# Patient Record
Sex: MALE | Race: BLACK OR AFRICAN AMERICAN | Employment: OTHER | URBAN - METROPOLITAN AREA
[De-identification: names, ages, dates, MRNs, and addresses within clinical notes are randomized per-mention and may not be internally consistent; named-entity substitution may affect disease eponyms.]

---

## 2017-01-10 ENCOUNTER — HOSPITAL ENCOUNTER (OUTPATIENT)
Dept: LAB | Age: 77
Discharge: HOME | End: 2017-01-10

## 2017-01-10 PROCEDURE — 88305 TISSUE EXAM BY PATHOLOGIST: CPT | Performed by: INTERNAL MEDICINE

## 2017-04-10 ENCOUNTER — APPOINTMENT (OUTPATIENT)
Dept: GENERAL RADIOLOGY | Age: 77
DRG: 310 | End: 2017-04-10
Attending: EMERGENCY MEDICINE
Payer: MEDICARE

## 2017-04-10 ENCOUNTER — HOSPITAL ENCOUNTER (INPATIENT)
Age: 77
LOS: 4 days | Discharge: HOME | DRG: 310 | End: 2017-04-14
Attending: EMERGENCY MEDICINE | Admitting: INTERNAL MEDICINE
Payer: MEDICARE

## 2017-04-10 DIAGNOSIS — J06.9 ACUTE UPPER RESPIRATORY INFECTION: ICD-10-CM

## 2017-04-10 DIAGNOSIS — I48.91 ATRIAL FIBRILLATION WITH RVR (HCC): Primary | ICD-10-CM

## 2017-04-10 LAB
ALBUMIN SERPL BCP-MCNC: 3.2 G/DL (ref 3.2–4.6)
ALBUMIN/GLOB SERPL: 0.9 {RATIO} (ref 1.2–3.5)
ALP SERPL-CCNC: 84 U/L (ref 50–136)
ALT SERPL-CCNC: 45 U/L (ref 12–65)
ANION GAP BLD CALC-SCNC: 9 MMOL/L (ref 7–16)
AST SERPL W P-5'-P-CCNC: 21 U/L (ref 15–37)
ATRIAL RATE: 113 BPM
ATRIAL RATE: 81 BPM
BASOPHILS # BLD AUTO: 0 K/UL (ref 0–0.2)
BASOPHILS # BLD: 0 % (ref 0–2)
BILIRUB SERPL-MCNC: 0.4 MG/DL (ref 0.2–1.1)
BNP SERPL-MCNC: 434 PG/ML
BUN SERPL-MCNC: 16 MG/DL (ref 8–23)
CALCIUM SERPL-MCNC: 9.6 MG/DL (ref 8.3–10.4)
CALCULATED R AXIS, ECG10: 19 DEGREES
CALCULATED R AXIS, ECG10: 20 DEGREES
CALCULATED T AXIS, ECG11: 17 DEGREES
CALCULATED T AXIS, ECG11: 26 DEGREES
CHLORIDE SERPL-SCNC: 108 MMOL/L (ref 98–107)
CO2 SERPL-SCNC: 25 MMOL/L (ref 21–32)
CREAT SERPL-MCNC: 1.04 MG/DL (ref 0.8–1.5)
DIAGNOSIS, 93000: NORMAL
DIAGNOSIS, 93000: NORMAL
DIFFERENTIAL METHOD BLD: ABNORMAL
EOSINOPHIL # BLD: 0 K/UL (ref 0–0.8)
EOSINOPHIL NFR BLD: 0 % (ref 0.5–7.8)
ERYTHROCYTE [DISTWIDTH] IN BLOOD BY AUTOMATED COUNT: 14 % (ref 11.9–14.6)
FLUAV AG NPH QL IA: NEGATIVE
FLUBV AG NPH QL IA: NEGATIVE
GLOBULIN SER CALC-MCNC: 3.4 G/DL (ref 2.3–3.5)
GLUCOSE SERPL-MCNC: 98 MG/DL (ref 65–100)
HCT VFR BLD AUTO: 34.7 % (ref 41.1–50.3)
HGB BLD-MCNC: 11.4 G/DL (ref 13.6–17.2)
IMM GRANULOCYTES # BLD: 0 K/UL (ref 0–0.5)
IMM GRANULOCYTES NFR BLD AUTO: 0.1 % (ref 0–5)
LYMPHOCYTES # BLD AUTO: 12 % (ref 13–44)
LYMPHOCYTES # BLD: 0.9 K/UL (ref 0.5–4.6)
MAGNESIUM SERPL-MCNC: 2.2 MG/DL (ref 1.8–2.4)
MAGNESIUM SERPL-MCNC: 2.4 MG/DL (ref 1.8–2.4)
MCH RBC QN AUTO: 27 PG (ref 26.1–32.9)
MCHC RBC AUTO-ENTMCNC: 32.9 G/DL (ref 31.4–35)
MCV RBC AUTO: 82.2 FL (ref 79.6–97.8)
MONOCYTES # BLD: 0.6 K/UL (ref 0.1–1.3)
MONOCYTES NFR BLD AUTO: 8 % (ref 4–12)
NEUTS SEG # BLD: 5.8 K/UL (ref 1.7–8.2)
NEUTS SEG NFR BLD AUTO: 80 % (ref 43–78)
PLATELET # BLD AUTO: 168 K/UL (ref 150–450)
PMV BLD AUTO: 12.8 FL (ref 10.8–14.1)
POTASSIUM SERPL-SCNC: 3.8 MMOL/L (ref 3.5–5.1)
PROT SERPL-MCNC: 6.6 G/DL (ref 6.3–8.2)
Q-T INTERVAL, ECG07: 338 MS
Q-T INTERVAL, ECG07: 340 MS
QRS DURATION, ECG06: 100 MS
QRS DURATION, ECG06: 96 MS
QTC CALCULATION (BEZET), ECG08: 420 MS
QTC CALCULATION (BEZET), ECG08: 445 MS
RBC # BLD AUTO: 4.22 M/UL (ref 4.23–5.67)
SODIUM SERPL-SCNC: 142 MMOL/L (ref 136–145)
TROPONIN I BLD-MCNC: 0.3 NG/ML (ref 0–0.08)
TROPONIN I BLD-MCNC: 0.34 NG/ML (ref 0–0.08)
TROPONIN I SERPL-MCNC: 0.43 NG/ML (ref 0.02–0.05)
TSH SERPL DL<=0.005 MIU/L-ACNC: <0.005 UIU/ML (ref 0.36–3.74)
VENTRICULAR RATE, ECG03: 103 BPM
VENTRICULAR RATE, ECG03: 93 BPM
WBC # BLD AUTO: 7.3 K/UL (ref 4.3–11.1)

## 2017-04-10 PROCEDURE — 84484 ASSAY OF TROPONIN QUANT: CPT

## 2017-04-10 PROCEDURE — 96376 TX/PRO/DX INJ SAME DRUG ADON: CPT | Performed by: EMERGENCY MEDICINE

## 2017-04-10 PROCEDURE — 74011000258 HC RX REV CODE- 258: Performed by: NURSE PRACTITIONER

## 2017-04-10 PROCEDURE — 74011250637 HC RX REV CODE- 250/637: Performed by: NURSE PRACTITIONER

## 2017-04-10 PROCEDURE — 96374 THER/PROPH/DIAG INJ IV PUSH: CPT | Performed by: EMERGENCY MEDICINE

## 2017-04-10 PROCEDURE — 83735 ASSAY OF MAGNESIUM: CPT | Performed by: NURSE PRACTITIONER

## 2017-04-10 PROCEDURE — 99285 EMERGENCY DEPT VISIT HI MDM: CPT | Performed by: EMERGENCY MEDICINE

## 2017-04-10 PROCEDURE — 83880 ASSAY OF NATRIURETIC PEPTIDE: CPT | Performed by: INTERNAL MEDICINE

## 2017-04-10 PROCEDURE — 65660000000 HC RM CCU STEPDOWN

## 2017-04-10 PROCEDURE — 83735 ASSAY OF MAGNESIUM: CPT | Performed by: EMERGENCY MEDICINE

## 2017-04-10 PROCEDURE — 93005 ELECTROCARDIOGRAM TRACING: CPT | Performed by: INTERNAL MEDICINE

## 2017-04-10 PROCEDURE — 93005 ELECTROCARDIOGRAM TRACING: CPT | Performed by: NURSE PRACTITIONER

## 2017-04-10 PROCEDURE — 93005 ELECTROCARDIOGRAM TRACING: CPT | Performed by: EMERGENCY MEDICINE

## 2017-04-10 PROCEDURE — 87804 INFLUENZA ASSAY W/OPTIC: CPT | Performed by: EMERGENCY MEDICINE

## 2017-04-10 PROCEDURE — 71010 XR CHEST PORT: CPT

## 2017-04-10 PROCEDURE — 85025 COMPLETE CBC W/AUTO DIFF WBC: CPT | Performed by: EMERGENCY MEDICINE

## 2017-04-10 PROCEDURE — 80053 COMPREHEN METABOLIC PANEL: CPT | Performed by: EMERGENCY MEDICINE

## 2017-04-10 PROCEDURE — 84443 ASSAY THYROID STIM HORMONE: CPT | Performed by: NURSE PRACTITIONER

## 2017-04-10 PROCEDURE — 74011000250 HC RX REV CODE- 250: Performed by: NURSE PRACTITIONER

## 2017-04-10 PROCEDURE — 74011000250 HC RX REV CODE- 250: Performed by: EMERGENCY MEDICINE

## 2017-04-10 PROCEDURE — 36415 COLL VENOUS BLD VENIPUNCTURE: CPT | Performed by: NURSE PRACTITIONER

## 2017-04-10 RX ORDER — SODIUM CHLORIDE 0.9 % (FLUSH) 0.9 %
5-10 SYRINGE (ML) INJECTION EVERY 8 HOURS
Status: DISCONTINUED | OUTPATIENT
Start: 2017-04-10 | End: 2017-04-14 | Stop reason: HOSPADM

## 2017-04-10 RX ORDER — DILTIAZEM HYDROCHLORIDE 5 MG/ML
10 INJECTION INTRAVENOUS ONCE
Status: COMPLETED | OUTPATIENT
Start: 2017-04-10 | End: 2017-04-10

## 2017-04-10 RX ORDER — SODIUM CHLORIDE 0.9 % (FLUSH) 0.9 %
5-10 SYRINGE (ML) INJECTION AS NEEDED
Status: DISCONTINUED | OUTPATIENT
Start: 2017-04-10 | End: 2017-04-10

## 2017-04-10 RX ORDER — ACETAMINOPHEN 325 MG/1
650 TABLET ORAL
Status: DISCONTINUED | OUTPATIENT
Start: 2017-04-10 | End: 2017-04-14 | Stop reason: HOSPADM

## 2017-04-10 RX ORDER — SODIUM CHLORIDE 0.9 % (FLUSH) 0.9 %
5-10 SYRINGE (ML) INJECTION EVERY 8 HOURS
Status: DISCONTINUED | OUTPATIENT
Start: 2017-04-10 | End: 2017-04-10

## 2017-04-10 RX ORDER — FUROSEMIDE 40 MG/1
40 TABLET ORAL ONCE
Status: COMPLETED | OUTPATIENT
Start: 2017-04-10 | End: 2017-04-10

## 2017-04-10 RX ORDER — SODIUM CHLORIDE 0.9 % (FLUSH) 0.9 %
5-10 SYRINGE (ML) INJECTION AS NEEDED
Status: DISCONTINUED | OUTPATIENT
Start: 2017-04-10 | End: 2017-04-14 | Stop reason: HOSPADM

## 2017-04-10 RX ORDER — SOTALOL HYDROCHLORIDE 80 MG/1
160 TABLET ORAL EVERY 12 HOURS
Status: DISCONTINUED | OUTPATIENT
Start: 2017-04-10 | End: 2017-04-14 | Stop reason: HOSPADM

## 2017-04-10 RX ORDER — NITROGLYCERIN 0.4 MG/1
0.4 TABLET SUBLINGUAL
Status: DISCONTINUED | OUTPATIENT
Start: 2017-04-10 | End: 2017-04-14 | Stop reason: HOSPADM

## 2017-04-10 RX ORDER — FUROSEMIDE 40 MG/1
40 TABLET ORAL DAILY
Status: DISCONTINUED | OUTPATIENT
Start: 2017-04-10 | End: 2017-04-10

## 2017-04-10 RX ADMIN — FUROSEMIDE 40 MG: 40 TABLET ORAL at 19:45

## 2017-04-10 RX ADMIN — SOTALOL HYDROCHLORIDE 160 MG: 80 TABLET ORAL at 23:06

## 2017-04-10 RX ADMIN — DILTIAZEM HYDROCHLORIDE 10 MG: 5 INJECTION INTRAVENOUS at 19:22

## 2017-04-10 RX ADMIN — Medication 5 ML: at 17:52

## 2017-04-10 RX ADMIN — Medication 10 ML: at 23:08

## 2017-04-10 RX ADMIN — DILTIAZEM HYDROCHLORIDE 10 MG: 5 INJECTION INTRAVENOUS at 17:51

## 2017-04-10 RX ADMIN — RIVAROXABAN 20 MG: 20 TABLET, FILM COATED ORAL at 23:06

## 2017-04-10 RX ADMIN — SODIUM CHLORIDE 10 MG/HR: 900 INJECTION, SOLUTION INTRAVENOUS at 19:45

## 2017-04-10 NOTE — ED TRIAGE NOTES
Sent from PCP office with afib with RVR rate of 140s-170s. Given 20 mg cardizem IV with EMS. Now 100-130 still afib. Hx of afib. Went to [employer] office with c/o shob, weakness, and productive cough with brown sputum.

## 2017-04-10 NOTE — IP AVS SNAPSHOT
[address] 
[phone] Patient: Betty Sapp MRN: [id number] [facility]:[birth year] You are allergic to the following Allergen Reactions Codeine Anaphylaxis Doxazosin Mesylate Rash \"broke out in a sweat\" Lotrel (Amlodipine-Benazepril) Cough Pcn (Penicillins) Anaphylaxis Recent Documentation Height Weight BMI Smoking Status 1.803 m 91.1 kg 28.01 kg/m2 Never Smoker Emergency Contacts Name Discharge Info Relation Home Work Mobile Mila Talley  Spouse [3] [phone] Viktoria Seth  Child [2] [phone] About your hospitalization You were admitted on:  April 10, 2017 You last received care in the:  [facility] 3 TELEMETRY You were discharged on:  April 14, 2017 Unit phone number:  [phone] Why you were hospitalized Your primary diagnosis was:  Atrial Fibrillation With Rvr (Hcc) Your diagnoses also included:  Htn (Hypertension), Mixed Hyperlipidemia, Gerd (Gastroesophageal Reflux Disease), Hyperthyroidism Providers Seen During Your Hospitalizations Provider Role Specialty Primary office phone Janusz Ching MD Attending Provider Emergency Medicine [phone] Lee Brown MD Attending Provider Cardiology [phone] Your Primary Care Physician (PCP) Primary Care Physician Office Phone Office Fax [address] [facility] [phone] Follow-up Information Follow up With Details Comments Contact Info Mini Elizondo MD  Monday 4/19 at 10:30 am [address] [place] [address] [address] [facility] [address] 
[phone] Ulises Winter MD On 5/2/2017 at 1 am in [place] office [address] Health system [id number] [phone] Your Appointments  Wednesday April 19, 2017 10:30 AM EDT  
 Office Visit with Leroy Patiño NP [facility] ([address]) [facility]  
[address] [place] [address] [phone] Tuesday May 02, 2017  8:30 AM EDT HOSPITAL FOLLOW-UP with Alber Sandoval, [address] [facility] ([facility]) [address] 
[address] [place] [address]  
[phone] Current Discharge Medication List  
  
START taking these medications Dose & Instructions Dispensing Information Comments Morning Noon Evening Bedtime  
 sotalol 160 mg tablet Commonly known as:  [place] Your next dose is:  4/14/2017 PM  
   
 Dose:  160 mg Take 1 Tab by mouth every twelve (12) hours. Quantity:  60 Tab Refills:  11 CONTINUE these medications which have NOT CHANGED Dose & Instructions Dispensing Information Comments Morning Noon Evening Bedtime L. gasseri-B. bifidum-B longum 1.5 billion cell Cap Take  by mouth as needed. Refills:  0  
     
   
   
   
  
 methIMAzole 10 mg tablet Commonly known as:  TAPAZOLE Dose:  5 mg Take 5 mg by mouth daily. Refills:  0  
     
   
   
   
  
 rivaroxaban 20 mg Tab tablet Commonly known as:  Becky [place] Dose:  20 mg Take 1 Tab by mouth daily (with dinner). Indications: PREVENT THROMBOEMBOLISM IN CHRONIC ATRIAL FIBRILLATION Quantity:  90 Tab Refills:  3 STOP taking these medications   
 dilTIAZem  mg ER capsule Commonly known as:  CARDIZEM CD  
   
  
 metoprolol tartrate 50 mg tablet Commonly known as:  LOPRESSOR Where to Get Your Medications Information on where to get these meds will be given to you by the nurse or doctor. ! Ask your nurse or doctor about these medications  
  sotalol 160 mg tablet Discharge Instructions Atrial Fibrillation: Care Instructions Your Care Instructions Atrial fibrillation is an irregular and often fast heartbeat. Treating this condition is important for several reasons. It can cause blood clots, which can travel from your heart to your brain and cause a stroke. If you have a fast heartbeat, you may feel lightheaded, dizzy, and weak. An irregular heartbeat can also increase your risk for heart failure. Atrial fibrillation is often the result of another heart condition, such as high blood pressure or coronary artery disease. Making changes to improve your heart condition will help you stay healthy and active. Follow-up care is a key part of your treatment and safety. Be sure to make and go to all appointments, and call your doctor if you are having problems. It's also a good idea to know your test results and keep a list of the medicines you take. How can you care for yourself at home? Medicines · Take your medicines exactly as prescribed. Call your doctor if you think you are having a problem with your medicine. You will get more details on the specific medicines your doctor prescribes. · If your doctor has given you a blood thinner to prevent a stroke, be sure you get instructions about how to take your medicine safely. Blood thinners can cause serious bleeding problems. · Do not take any vitamins, over-the-counter drugs, or herbal products without talking to your doctor first. 
Lifestyle changes · Do not smoke. Smoking can increase your chance of a stroke and heart attack. If you need help quitting, talk to your doctor about stop-smoking programs and medicines. These can increase your chances of quitting for good. · Eat a heart-healthy diet. · Stay at a healthy weight. Lose weight if you need to. · Limit alcohol to 2 drinks a day for men and 1 drink a day for women. Too much alcohol can cause health problems. · Avoid colds and flu. Get a pneumococcal vaccine shot.  If you have had one before, ask your doctor whether you need another dose. Get a flu shot every year. If you must be around people with colds or flu, wash your hands often. Activity · If your doctor recommends it, get more exercise. Walking is a good choice. Bit by bit, increase the amount you walk every day. Try for at least 30 minutes on most days of the week. You also may want to swim, bike, or do other activities. Your doctor may suggest that you join a cardiac rehabilitation program so that you can have help increasing your physical activity safely. · Start light exercise if your doctor says it is okay. Even a small amount will help you get stronger, have more energy, and manage stress. Walking is an easy way to get exercise. Start out by walking a little more than you did in the hospital. Gradually increase the amount you walk. · When you exercise, watch for signs that your heart is working too hard. You are pushing too hard if you cannot talk while you are exercising. If you become short of breath or dizzy or have chest pain, sit down and rest immediately. · Check your pulse regularly. Place two fingers on the artery at the palm side of your wrist, in line with your thumb. If your heartbeat seems uneven or fast, talk to your doctor. When should you call for help? Call 911 anytime you think you may need emergency care. For example, call if: 
· You have symptoms of a heart attack. These may include: ¨ Chest pain or pressure, or a strange feeling in the chest. 
¨ Sweating. ¨ Shortness of breath. ¨ Nausea or vomiting. ¨ Pain, pressure, or a strange feeling in the back, neck, jaw, or upper belly or in one or both shoulders or arms. ¨ Lightheadedness or sudden weakness. ¨ A fast or irregular heartbeat. After you call 911, the  may tell you to chew 1 adult-strength or 2 to 4 low-dose aspirin. Wait for an ambulance. Do not try to drive yourself. · You have symptoms of a stroke. These may include: ¨ Sudden numbness, tingling, weakness, or loss of movement in your face, arm, or leg, especially on only one side of your body. ¨ Sudden vision changes. ¨ Sudden trouble speaking. ¨ Sudden confusion or trouble understanding simple statements. ¨ Sudden problems with walking or balance. ¨ A sudden, severe headache that is different from past headaches. · You passed out (lost consciousness). Call your doctor now or seek immediate medical care if: 
· You have new or increased shortness of breath. · You feel dizzy or lightheaded, or you feel like you may faint. · Your heart rate becomes irregular. · You can feel your heart flutter in your chest or skip heartbeats. Tell your doctor if these symptoms are new or worse. Watch closely for changes in your health, and be sure to contact your doctor if you have any problems. Where can you learn more? Go to http://harley-abilio.info/. Enter U020 in the search box to learn more about \"Atrial Fibrillation: Care Instructions. \" Current as of: January 27, 2016 Content Version: 11.2 © 0278-8513 [employer]. Care instructions adapted under license by [employer] (which disclaims liability or warranty for this information). If you have questions about a medical condition or this instruction, always ask your healthcare professional. [address] any warranty or liability for your use of this information. Sotalol (By mouth) Sotalol (ARNOLD-ta-lol) Treats heart rhythm problems. This medicine is a beta blocker. Brand Name(s):Betapace, Betapace AF, Sorine, [employer] There may be other brand names for this medicine. When This Medicine Should Not Be Used: This medicine is not right for everyone. Do not use it if you had an allergic reaction to sotalol, or you have certain heart or lung problems. Talk with your doctor about what these problems are. How to Use This Medicine:  
Liquid, Tablet · Take your medicine as directed. Your dose may need to be changed several times to find what works best for you. · Measure the oral liquid medicine with a marked measuring spoon, oral syringe, or medicine cup. · Contact your doctor or pharmacist before your supply of this medicine starts to run low. Do not allow yourself to run out of medicine. · Read and follow the patient instructions that come with this medicine. Talk to your doctor or pharmacist if you have any questions. · Missed dose: Take a dose as soon as you remember. If it is almost time for your next dose, wait until then and take a regular dose. Do not take extra medicine to make up for a missed dose. · Store the medicine in a closed container at room temperature, away from heat, moisture, and direct light. Drugs and Foods to Avoid: Ask your doctor or pharmacist before using any other medicine, including over-the-counter medicines, vitamins, and herbal products. · Some foods and medicines can affect how sotalol works. Tell your doctor if you are using the following: ¨ Albuterol, clonidine, digoxin, guanethidine, isoproterenol, reserpine, terbutaline ¨ Blood pressure medicines ¨ Insulin or diabetes medicine ¨ Medicine to treat depression ¨ Medicine to treat an infection ¨ Other medicine to treat heart rhythm problems, such as amiodarone, disopyramide, procainamide, or quinidine ¨ Phenothiazine medicine (such as chlorpromazine, perphenazine, prochlorperazine, promethazine, thioridazine) · If you are taking an antacid that contains aluminum or magnesium hydroxide, take it 2 hours before or 2 hours after you take sotalol. Warnings While Using This Medicine: · Tell your doctor if you are pregnant or breastfeeding, or if you have kidney disease, diabetes, heart disease, angina, low blood pressure, lung or breathing problems, overactive thyroid, or a history of severe allergic reactions or heart attack. · This medicine may cause increased heart rhythm problems while your dose is being adjusted. · Do not stop using this medicine suddenly. Your doctor will need to slowly decrease your dose before you stop it completely. You could have worsening chest pain or heart rhythm problems if you stop using this medicine suddenly. · This medicine may raise or lower your blood sugar level. · This medicine may make you dizzy. Do not drive or do anything else that could be dangerous until you know how this medicine affects you. Stand up slowly if you feel dizzy or lightheaded. · Tell any doctor or dentist who treats you that you are using this medicine. · Your doctor will do lab tests at regular visits to check on the effects of this medicine. Keep all appointments. ECG tests will be needed to check for unwanted effects. · Keep all medicine out of the reach of children. Never share your medicine with anyone. Possible Side Effects While Using This Medicine:  
Call your doctor right away if you notice any of these side effects: · Allergic reaction: Itching or hives, swelling in your face or hands, swelling or tingling in your mouth or throat, chest tightness, trouble breathing · Chest pain · Dry mouth, increased thirst, muscle cramps, nausea or vomiting · Fainting, dizziness, lightheadedness · Fast, slow, or irregular heartbeat · Rapid weight gain, swelling in your hands, feet, or ankles, trouble breathing If you notice these less serious side effects, talk with your doctor: · Diarrhea · Increased sweating · Tiredness or weakness If you notice other side effects that you think are caused by this medicine, tell your doctor. Call your doctor for medical advice about side effects. You may report side effects to FDA at 0-249-FDA-4839 © 2016 [address] is for End User's use only and may not be sold, redistributed or otherwise used for commercial purposes. The above information is an  only. It is not intended as medical advice for individual conditions or treatments. Talk to your doctor, nurse or pharmacist before following any medical regimen to see if it is safe and effective for you. Discharge Orders None ACO Transitions of Care Introducing [employer] [address] offers a voluntary care coordination program to provide high quality service and care to [facility] fee-for-service beneficiaries. Stef Forbes was designed to help you enhance your health and well-being through the following services: ? Transitions of Care  support for individuals who are transitioning from one care setting to another (example: Hospital to home). ? Chronic and Complex Care Coordination  support for individuals and caregivers of those with serious or chronic illnesses or with more than one chronic (ongoing) condition and those who take a number of different medications. If you meet specific medical criteria, a [facility] [address] may call you directly to coordinate your care with your primary care physician and your other care providers. For questions about the [facility] programs, please, contact your physicians office. For general questions or additional information about Accountable Care Organizations: 
Please visit www.medicare.gov/acos. html or call 1-800-MEDICARE (2-176.470.5600) TTY users should call 3-341.667.3002. I AM AT Announcement We are excited to announce that we are making your provider's discharge notes available to you in I AM AT. You will see these notes when they are completed and signed by the physician that discharged you from your recent hospital stay.   If you have any questions or concerns about any information you see in I AM AT, please call the [facility] Department where you were seen or reach out to your Primary Care Provider for more information about your plan of care. Introducing [facility]! [facility] introduces ARPU patient portal. Now you can access parts of your medical record, email your doctor's office, and request medication refills online. 1. In your internet browser, go to [email or web address] 2. Click on the First Time User? Click Here link in the Sign In box. You will see the New Member Sign Up page. 3. Enter your ARPU Access Code exactly as it appears below. You will not need to use this code after youve completed the sign-up process. If you do not sign up before the expiration date, you must request a new code. · ARPU Access Code: [email or web address] Expires: 7/9/2017  9:59 AM 
 
4. Enter the last four digits of your Social Security Number (xxxx) and Date of Birth (mm/dd/yyyy) as indicated and click Submit. You will be taken to the next sign-up page. 5. Create a ARPU ID. This will be your ARPU login ID and cannot be changed, so think of one that is secure and easy to remember. 6. Create a ARPU password. You can change your password at any time. 7. Enter your Password Reset Question and Answer. This can be used at a later time if you forget your password. 8. Enter your e-mail address. You will receive e-mail notification when new information is available in [address] 9. Click Sign Up. You can now view and download portions of your medical record. 10. Click the Download Summary menu link to download a portable copy of your medical information. If you have questions, please visit the Frequently Asked Questions section of the ARPU website. Remember, ARPU is NOT to be used for urgent needs. For medical emergencies, dial 911. Now available from your iPhone and Android! General Information Please provide this summary of care documentation to your next provider. Patient Signature:  ____________________________________________________________ Date:  ____________________________________________________________  
  
Theone Godwin Provider Signature:  ____________________________________________________________ Date:  ____________________________________________________________

## 2017-04-10 NOTE — H&P
[facility] Cardiology History & Physical      Date of  Admission: 4/10/2017  2:07 PM     Primary Care Physician:  Dr. Gene Britt III  Primary Cardiologist:  Dr. Eryn Kamara Physician:  Dr. Elpidio Butterfield    CC: a fib with RVR    HPI:  Kary Porras is a 68 y.o. male with a PMH of HTN, a fib, intracranial meningioma with craniotomy in 2014, and hyperlipidemia, who presented to ED from PCP office with fatigue, cough and tachycardia. He went to PCP because of a persistent cough with increased fatigue, SOB and dizziness overnight. In the PCP office he was found to be tachycardic, with EKG revealing a fib with RVR. He was sent to [facility] ED. In ER he remained in a fib. Troponin is mildly elevated at 0.34. In 2014 during hospitalization he developed a fib which was difficult to manage with medications and he was subsequently cardioverted to NSR. He takes cardizem, metoprolol and Xarelto at home. He denies other episodes of afib, but has not been seen in the office in over a year reporting his PCP manages his medications. Denies CP, orthopnea, nausea, vomiting, fever, or chills. He does admit he has missed 2 doses of Xarelto last week.        Past Medical History:   Diagnosis Date    Abdominal pain, chronic, right lower quadrant 3/24/2015    Adhesive capsulitis of shoulder     Anticoagulation monitoring, INR range 2-3 5/28/2009    Atrial fibrillation (HCC)     Atrial flutter (HCC) 7/21/2014    Carbuncle and furuncle of trunk     Carbuncle and furuncle of unspecified site     Closed fracture of one rib     Congestive heart failure, unspecified     Contact dermatitis and other eczema, due to unspecified cause     Contusion of knee     Dementia     Diarrhea     Dizziness and giddiness     Encounter for long-term (current) use of other medications     GERD (gastroesophageal reflux disease) 6/30/2015    Goiter, unspecified     H/O prostate cancer 7/9/2014    S/p prostatectomy (2002)     Hearing impaired 6/30/2015    HTN (hypertension) 5/26/2009    [facility] 7/23/2014    Left ventricular hypertrophy 5/28/2009    Long term current use of anticoagulant therapy     Mitral valve regurgitation 5/28/2009    Mixed hyperlipidemia     Nodular goiter, toxic or with hyperthyroidism 5/28/2009    Other specified congenital anomaly of heart     Other symptoms involving cardiovascular system     Pain in joint, lower leg     Pain in joint, shoulder region     Pain in limb     Pure hypercholesterolemia     Rhinitis due to pollen 3/24/2015    SIRS (systemic inflammatory response syndrome) ([facility] 7/22/2014    Thyrotoxicosis without mention of goiter or other cause, without mention of thyrotoxic crisis or storm     Unspecified hypothyroidism       Past Surgical History:   Procedure Laterality Date    BREAST SURGERY PROCEDURE UNLISTED      left breast cyst removal    HX COLONOSCOPY  9/23/2015    colon polyps, gastric polyps    HX PROSTATECTOMY         Allergies   Allergen Reactions    Codeine Anaphylaxis    Doxazosin Mesylate Rash     \"broke out in a sweat\"    Lotrel [Amlodipine-Benazepril] Cough    Pcn [Penicillins] Anaphylaxis      Social History     Social History    Marital status:      Spouse name: N/A    Number of children: N/A    Years of education: N/A     Occupational History    Not on file.      Social History Main Topics    Smoking status: Never Smoker    Smokeless tobacco: Never Used    Alcohol use No      Comment: quit    Drug use: No    Sexual activity: No     Other Topics Concern    Not on file     Social History Narrative     Family History   Problem Relation Age of Onset    Heart Attack Mother     Cancer Father      thyroid    Alcohol abuse Father     Hypertension Sister     Rashes/Skin Problems Brother     Asthma Sister     Hypertension Brother     Heart Disease Other         Current Facility-Administered Medications   Medication Dose Route Frequency  sodium chloride (NS) flush 5-10 mL  5-10 mL IntraVENous Q8H    sodium chloride (NS) flush 5-10 mL  5-10 mL IntraVENous PRN    dilTIAZem (CARDIZEM) injection 10 mg  10 mg IntraVENous ONCE     Current Outpatient Prescriptions   Medication Sig    L. gasseri-B. bifidum-B longum 1.5 billion cell cap Take  by mouth as needed.  rivaroxaban (XARELTO) 20 mg tab tablet Take 1 Tab by mouth daily (with dinner). Indications: PREVENT THROMBOEMBOLISM IN CHRONIC ATRIAL FIBRILLATION    diltiazem CD (CARDIZEM CD) 120 mg ER capsule TAKE ONE CAPSULE BY MOUTH TWICE A DAY    metoprolol tartrate (LOPRESSOR) 50 mg tablet TAKE 1 TABLET BY MOUTH TWICE A DAY    methimazole (TAPAZOLE) 10 mg tablet Take 5 mg by mouth daily. Review of Systems    Review of Systems   Constitution: Positive for malaise/fatigue. HENT: Negative. Eyes: Negative. Cardiovascular: Negative for chest pain, leg swelling, near-syncope, orthopnea and syncope. Respiratory: Positive for shortness of breath. Endocrine: Negative. Hematologic/Lymphatic: Negative. Musculoskeletal: Negative. Gastrointestinal: Negative. Neurological: Positive for dizziness. Psychiatric/Behavioral: Negative. Subjective:     Visit Vitals    /75    Pulse (!) 107    Temp 97.5 °F (36.4 °C)    Resp 22    Ht 5' 11\" (1.803 m)    Wt 91.2 kg (201 lb)    SpO2 99%    BMI 28.03 kg/m2     Physical Exam   Constitutional: He is oriented to person, place, and time and well-developed, well-nourished, and in no distress. HENT:   Head: Normocephalic. Eyes: Pupils are equal, round, and reactive to light. Neck: Normal range of motion. JVD present. Cardiovascular: An irregularly irregular rhythm present. Tachycardia present. Pulmonary/Chest: Effort normal. He has rales in the right lower field and the left lower field. Abdominal: Soft. Bowel sounds are normal.   Musculoskeletal: Normal range of motion.    Neurological: He is alert and oriented to person, place, and time. Skin: Skin is warm. Psychiatric: Mood, memory, affect and judgment normal.       Cardiographics  Telemetry: AFIB  ECG: atrial fibrillation, rate 103  Echocardiogram: pending    Labs:   Recent Results (from the past 24 hour(s))   EKG, 12 LEAD, INITIAL    Collection Time: 04/10/17  2:13 PM   Result Value Ref Range    Ventricular Rate 103 BPM    Atrial Rate 81 BPM    QRS Duration 96 ms    Q-T Interval 340 ms    QTC Calculation (Bezet) 445 ms    Calculated R Axis 19 degrees    Calculated T Axis 26 degrees    Diagnosis       !! AGE AND GENDER SPECIFIC ECG ANALYSIS !! Atrial fibrillation with rapid ventricular response  Minimal voltage criteria for LVH, may be normal variant  Septal infarct (cited on or before 26-MAY-2009)  Abnormal ECG  When compared with ECG of 08-JUL-2014 18:42,  Atrial fibrillation has replaced Sinus rhythm  Vent. rate has increased BY  46 BPM  Confirmed by Alanna Duckworth ([id number]) on 4/10/2017 4:42:04 PM     CBC WITH AUTOMATED DIFF    Collection Time: 04/10/17  2:20 PM   Result Value Ref Range    WBC 7.3 4.3 - 11.1 K/uL    RBC 4.22 (L) 4.23 - 5.67 M/uL    HGB 11.4 (L) 13.6 - 17.2 g/dL    HCT 34.7 (L) 41.1 - 50.3 %    MCV 82.2 79.6 - 97.8 FL    MCH 27.0 26.1 - 32.9 PG    MCHC 32.9 31.4 - 35.0 g/dL    RDW 14.0 11.9 - 14.6 %    PLATELET 891 287 - 665 K/uL    MPV 12.8 10.8 - 14.1 FL    DF AUTOMATED      NEUTROPHILS 80 (H) 43 - 78 %    LYMPHOCYTES 12 (L) 13 - 44 %    MONOCYTES 8 4.0 - 12.0 %    EOSINOPHILS 0 (L) 0.5 - 7.8 %    BASOPHILS 0 0.0 - 2.0 %    IMMATURE GRANULOCYTES 0.1 0.0 - 5.0 %    ABS. NEUTROPHILS 5.8 1.7 - 8.2 K/UL    ABS. LYMPHOCYTES 0.9 0.5 - 4.6 K/UL    ABS. MONOCYTES 0.6 0.1 - 1.3 K/UL    ABS. EOSINOPHILS 0.0 0.0 - 0.8 K/UL    ABS. BASOPHILS 0.0 0.0 - 0.2 K/UL    ABS. IMM.  GRANS. 0.0 0.0 - 0.5 K/UL   METABOLIC PANEL, COMPREHENSIVE    Collection Time: 04/10/17  2:20 PM   Result Value Ref Range    Sodium 142 136 - 145 mmol/L    Potassium 3.8 3.5 - 5.1 mmol/L    Chloride 108 (H) 98 - 107 mmol/L    CO2 25 21 - 32 mmol/L    Anion gap 9 7 - 16 mmol/L    Glucose 98 65 - 100 mg/dL    BUN 16 8 - 23 MG/DL    Creatinine 1.04 0.8 - 1.5 MG/DL    GFR est AA >60 >60 ml/min/1.73m2    GFR est non-AA >60 >60 ml/min/1.73m2    Calcium 9.6 8.3 - 10.4 MG/DL    Bilirubin, total 0.4 0.2 - 1.1 MG/DL    ALT (SGPT) 45 12 - 65 U/L    AST (SGOT) 21 15 - 37 U/L    Alk. phosphatase 84 50 - 136 U/L    Protein, total 6.6 6.3 - 8.2 g/dL    Albumin 3.2 3.2 - 4.6 g/dL    Globulin 3.4 2.3 - 3.5 g/dL    A-G Ratio 0.9 (L) 1.2 - 3.5     MAGNESIUM    Collection Time: 04/10/17  2:20 PM   Result Value Ref Range    Magnesium 2.2 1.8 - 2.4 mg/dL   POC TROPONIN-I    Collection Time: 04/10/17  2:37 PM   Result Value Ref Range    Troponin-I (POC) 0.3 (H) 0.0 - 0.08 ng/ml   INFLUENZA A & B AG (RAPID TEST)    Collection Time: 04/10/17  4:00 PM   Result Value Ref Range    Influenza A Ag NEGATIVE  NEG      Influenza B Ag NEGATIVE  NEG     POC TROPONIN-I    Collection Time: 04/10/17  5:57 PM   Result Value Ref Range    Troponin-I (POC) 0.34 (H) 0.0 - 0.08 ng/ml       Patient has been seen and examined by Dr. Doug Smith and he agrees with the following assessment and plan:     Assessment/Plan:       Principal Problem:    Atrial fibrillation with RVR -- admit patient to tele. Start cardizem drip, stop if HR <90. Will start sotalol loading. Check BMP and Mag daily. Continue Xarelto. Will make NPO for possible eCV in AM.  Repeat Echo, ?HF. HTN (hypertension) -- will monitor with Cardizem and sotalol. Titrate medications as needed.        Mixed hyperlipidemia-- check lipids      Carolyn Angel NP  4/10/2017 7:04 PM

## 2017-04-10 NOTE — ED PROVIDER NOTES
HPI Comments: Patient sent here from [de-identified] office. Patient has baseline atrial fib. He is intermittently in this rhythm but at other/dominantly times sinus. He initially went to the doctor's office for a cough as been present for several days. It at times has scant productive sputum but most times is nonproductive. He has felt fatigued since last night. The sputum when it is produces somewhat green in color. Denies any significant typical anginal or chest pain of ischemic quality. Patient is a 68 y.o. male presenting with shortness of breath. The history is provided by the patient and the spouse. Shortness of Breath   This is a new problem. Associated symptoms include cough. Pertinent negatives include no fever, no wheezing and no leg swelling.         Past Medical History:   Diagnosis Date    Abdominal pain, chronic, right lower quadrant 3/24/2015    Adhesive capsulitis of shoulder     Anticoagulation monitoring, INR range 2-3 5/28/2009    Atrial fibrillation (HCC)     Atrial flutter (HCC) 7/21/2014    Carbuncle and furuncle of trunk     Carbuncle and furuncle of unspecified site     Closed fracture of one rib     Congestive heart failure, unspecified     Contact dermatitis and other eczema, due to unspecified cause     Contusion of knee     Dementia     Diarrhea     Dizziness and giddiness     Encounter for long-term (current) use of other medications     GERD (gastroesophageal reflux disease) 6/30/2015    Goiter, unspecified     H/O prostate cancer 7/9/2014    S/p prostatectomy (2002)     Hearing impaired 6/30/2015    HTN (hypertension) 5/26/2009    Intracranial meningioma ([address] 7/23/2014    Left ventricular hypertrophy 5/28/2009    Long term current use of anticoagulant therapy     Mitral valve regurgitation 5/28/2009    Mixed hyperlipidemia     Nodular goiter, toxic or with hyperthyroidism 5/28/2009    Other specified congenital anomaly of heart     Other symptoms involving cardiovascular system     Pain in joint, lower leg     Pain in joint, shoulder region     Pain in limb     Pure hypercholesterolemia     Rhinitis due to pollen 3/24/2015    SIRS (systemic inflammatory response syndrome) ([facility] 7/22/2014    Thyrotoxicosis without mention of goiter or other cause, without mention of thyrotoxic crisis or storm     Unspecified hypothyroidism        Past Surgical History:   Procedure Laterality Date    BREAST SURGERY PROCEDURE UNLISTED      left breast cyst removal    HX COLONOSCOPY  9/23/2015    colon polyps, gastric polyps    HX PROSTATECTOMY           Family History:   Problem Relation Age of Onset    Heart Attack Mother     Cancer Father      thyroid    Alcohol abuse Father     Hypertension Sister     Rashes/Skin Problems Brother     Asthma Sister     Hypertension Brother     Heart Disease Other        Social History     Social History    Marital status:      Spouse name: N/A    Number of children: N/A    Years of education: N/A     Occupational History    Not on file. Social History Main Topics    Smoking status: Never Smoker    Smokeless tobacco: Never Used    Alcohol use No      Comment: quit    Drug use: No    Sexual activity: No     Other Topics Concern    Not on file     Social History Narrative         ALLERGIES: Codeine; Doxazosin mesylate; Lotrel [amlodipine-benazepril]; and Pcn [penicillins]    Review of Systems   Constitutional: Positive for fatigue. Negative for chills and fever. Respiratory: Positive for cough and shortness of breath. Negative for wheezing and stridor. Cardiovascular: Negative for palpitations and leg swelling. Gastrointestinal: Negative. All other systems reviewed and are negative.       Vitals:    04/10/17 1520 04/10/17 1530 04/10/17 1545 04/10/17 1600   BP: 146/72 (!) 145/95 144/70 161/75   Pulse:  (!) 105 (!) 106 (!) 107   Resp:  22 22   Temp: 97.5 °F (36.4 °C)      SpO2:  98% 99% 99% Weight:       Height:                Physical Exam   Constitutional: He appears well-developed and well-nourished. No distress. HENT:   Head: Atraumatic. Severely hard of hearing   Eyes: No scleral icterus. Neck: Neck supple. Cardiovascular: Intact distal pulses. An irregularly irregular rhythm present. Occasional extrasystoles are present. Tachycardia present. Pulmonary/Chest: Effort normal. No respiratory distress. He has no wheezes. He has no rales. Abdominal: Soft. There is no tenderness. There is no rebound. Musculoskeletal: He exhibits no edema, tenderness or deformity. Neurological: He is alert. Skin: Skin is warm and dry. Psychiatric: Thought content normal.   Nursing note and vitals reviewed. MDM  Number of Diagnoses or Management Options  Acute upper respiratory infection:   Atrial fibrillation with RVR [facility]):   Diagnosis management comments: A fib with rvr and elevated troponin most likely related to mismatch.  No increasing troponin trend       Amount and/or Complexity of Data Reviewed  Clinical lab tests: ordered and reviewed  Tests in the radiology section of CPT®: reviewed and ordered  Obtain history from someone other than the patient: yes  Discuss the patient with other providers: yes  Independent visualization of images, tracings, or specimens: yes    Risk of Complications, Morbidity, and/or Mortality  Presenting problems: high  Diagnostic procedures: low  Management options: moderate    Patient Progress  Patient progress: stable    ED Course       Procedures

## 2017-04-10 NOTE — IP AVS SNAPSHOT
Current Discharge Medication List  
  
START taking these medications Dose & Instructions Dispensing Information Comments Morning Noon Evening Bedtime  
 sotalol 160 mg tablet Commonly known as:  Genie Ashley Your next dose is:  4/14/2017 PM  
   
 Dose:  160 mg Take 1 Tab by mouth every twelve (12) hours. Quantity:  60 Tab Refills:  11 CONTINUE these medications which have NOT CHANGED Dose & Instructions Dispensing Information Comments Morning Noon Evening Bedtime L. gasseri-B. bifidum-B longum 1.5 billion cell Cap Take  by mouth as needed. Refills:  0  
     
   
   
   
  
 methIMAzole 10 mg tablet Commonly known as:  TAPAZOLE Dose:  5 mg Take 5 mg by mouth daily. Refills:  0  
     
   
   
   
  
 rivaroxaban 20 mg Tab tablet Commonly known as:  Wava Capuchin Dose:  20 mg Take 1 Tab by mouth daily (with dinner). Indications: PREVENT THROMBOEMBOLISM IN CHRONIC ATRIAL FIBRILLATION Quantity:  90 Tab Refills:  3 STOP taking these medications   
 dilTIAZem  mg ER capsule Commonly known as:  CARDIZEM CD  
   
  
 metoprolol tartrate 50 mg tablet Commonly known as:  LOPRESSOR Where to Get Your Medications Information on where to get these meds will be given to you by the nurse or doctor. ! Ask your nurse or doctor about these medications  
  sotalol 160 mg tablet

## 2017-04-11 PROBLEM — E05.90 HYPERTHYROIDISM: Status: ACTIVE | Noted: 2017-04-11

## 2017-04-11 LAB
ANION GAP BLD CALC-SCNC: 7 MMOL/L (ref 7–16)
ATRIAL RATE: 241 BPM
ATRIAL RATE: 312 BPM
BUN SERPL-MCNC: 15 MG/DL (ref 8–23)
CALCIUM SERPL-MCNC: 9.1 MG/DL (ref 8.3–10.4)
CALCULATED R AXIS, ECG10: 11 DEGREES
CALCULATED R AXIS, ECG10: 16 DEGREES
CALCULATED T AXIS, ECG11: 26 DEGREES
CALCULATED T AXIS, ECG11: 3 DEGREES
CHLORIDE SERPL-SCNC: 106 MMOL/L (ref 98–107)
CHOLEST SERPL-MCNC: 140 MG/DL
CO2 SERPL-SCNC: 30 MMOL/L (ref 21–32)
CREAT SERPL-MCNC: 0.97 MG/DL (ref 0.8–1.5)
DIAGNOSIS, 93000: NORMAL
DIAGNOSIS, 93000: NORMAL
ERYTHROCYTE [DISTWIDTH] IN BLOOD BY AUTOMATED COUNT: 13.9 % (ref 11.9–14.6)
GLUCOSE SERPL-MCNC: 103 MG/DL (ref 65–100)
HCT VFR BLD AUTO: 35.3 % (ref 41.1–50.3)
HDLC SERPL-MCNC: 51 MG/DL (ref 40–60)
HDLC SERPL: 2.7 {RATIO}
HGB BLD-MCNC: 11.3 G/DL (ref 13.6–17.2)
LDLC SERPL CALC-MCNC: 80.4 MG/DL
LIPID PROFILE,FLP: NORMAL
MAGNESIUM SERPL-MCNC: 2.1 MG/DL (ref 1.8–2.4)
MCH RBC QN AUTO: 26.8 PG (ref 26.1–32.9)
MCHC RBC AUTO-ENTMCNC: 32 G/DL (ref 31.4–35)
MCV RBC AUTO: 83.6 FL (ref 79.6–97.8)
PLATELET # BLD AUTO: 170 K/UL (ref 150–450)
PMV BLD AUTO: 12.5 FL (ref 10.8–14.1)
POTASSIUM SERPL-SCNC: 3.3 MMOL/L (ref 3.5–5.1)
Q-T INTERVAL, ECG07: 354 MS
Q-T INTERVAL, ECG07: 410 MS
QRS DURATION, ECG06: 102 MS
QRS DURATION, ECG06: 104 MS
QTC CALCULATION (BEZET), ECG08: 444 MS
QTC CALCULATION (BEZET), ECG08: 472 MS
RBC # BLD AUTO: 4.22 M/UL (ref 4.23–5.67)
SODIUM SERPL-SCNC: 143 MMOL/L (ref 136–145)
T4 FREE SERPL-MCNC: 2 NG/DL (ref 0.78–1.46)
TRIGL SERPL-MCNC: 43 MG/DL (ref 35–150)
TROPONIN I SERPL-MCNC: 0.41 NG/ML (ref 0.02–0.05)
VENTRICULAR RATE, ECG03: 80 BPM
VENTRICULAR RATE, ECG03: 95 BPM
VLDLC SERPL CALC-MCNC: 8.6 MG/DL (ref 6–23)
WBC # BLD AUTO: 6.2 K/UL (ref 4.3–11.1)

## 2017-04-11 PROCEDURE — 80061 LIPID PANEL: CPT | Performed by: NURSE PRACTITIONER

## 2017-04-11 PROCEDURE — 83735 ASSAY OF MAGNESIUM: CPT | Performed by: NURSE PRACTITIONER

## 2017-04-11 PROCEDURE — 74011250637 HC RX REV CODE- 250/637: Performed by: INTERNAL MEDICINE

## 2017-04-11 PROCEDURE — 93306 TTE W/DOPPLER COMPLETE: CPT

## 2017-04-11 PROCEDURE — 85027 COMPLETE CBC AUTOMATED: CPT | Performed by: NURSE PRACTITIONER

## 2017-04-11 PROCEDURE — 74011250637 HC RX REV CODE- 250/637: Performed by: NURSE PRACTITIONER

## 2017-04-11 PROCEDURE — 80048 BASIC METABOLIC PNL TOTAL CA: CPT | Performed by: NURSE PRACTITIONER

## 2017-04-11 PROCEDURE — 36415 COLL VENOUS BLD VENIPUNCTURE: CPT | Performed by: NURSE PRACTITIONER

## 2017-04-11 PROCEDURE — 93005 ELECTROCARDIOGRAM TRACING: CPT | Performed by: NURSE PRACTITIONER

## 2017-04-11 PROCEDURE — 74011000258 HC RX REV CODE- 258: Performed by: NURSE PRACTITIONER

## 2017-04-11 PROCEDURE — 84484 ASSAY OF TROPONIN QUANT: CPT | Performed by: NURSE PRACTITIONER

## 2017-04-11 PROCEDURE — 84439 ASSAY OF FREE THYROXINE: CPT | Performed by: INTERNAL MEDICINE

## 2017-04-11 PROCEDURE — 65660000000 HC RM CCU STEPDOWN

## 2017-04-11 PROCEDURE — 74011000250 HC RX REV CODE- 250: Performed by: NURSE PRACTITIONER

## 2017-04-11 RX ORDER — POTASSIUM CHLORIDE 20 MEQ/1
40 TABLET, EXTENDED RELEASE ORAL
Status: COMPLETED | OUTPATIENT
Start: 2017-04-11 | End: 2017-04-11

## 2017-04-11 RX ORDER — LORATADINE 10 MG/1
10 TABLET ORAL DAILY
Status: DISCONTINUED | OUTPATIENT
Start: 2017-04-11 | End: 2017-04-14 | Stop reason: HOSPADM

## 2017-04-11 RX ORDER — METHIMAZOLE 10 MG/1
10 TABLET ORAL DAILY
Status: DISCONTINUED | OUTPATIENT
Start: 2017-04-11 | End: 2017-04-14 | Stop reason: HOSPADM

## 2017-04-11 RX ORDER — GUAIFENESIN 600 MG/1
600 TABLET, EXTENDED RELEASE ORAL EVERY 12 HOURS
Status: DISCONTINUED | OUTPATIENT
Start: 2017-04-11 | End: 2017-04-14 | Stop reason: HOSPADM

## 2017-04-11 RX ORDER — PANTOPRAZOLE SODIUM 40 MG/1
40 TABLET, DELAYED RELEASE ORAL
Status: DISCONTINUED | OUTPATIENT
Start: 2017-04-11 | End: 2017-04-14 | Stop reason: HOSPADM

## 2017-04-11 RX ADMIN — SODIUM CHLORIDE 15 MG/HR: 900 INJECTION, SOLUTION INTRAVENOUS at 23:45

## 2017-04-11 RX ADMIN — SOTALOL HYDROCHLORIDE 160 MG: 80 TABLET ORAL at 09:41

## 2017-04-11 RX ADMIN — METHIMAZOLE 10 MG: 10 TABLET ORAL at 13:37

## 2017-04-11 RX ADMIN — Medication 5 ML: at 13:42

## 2017-04-11 RX ADMIN — PANTOPRAZOLE SODIUM 40 MG: 40 TABLET, DELAYED RELEASE ORAL at 09:41

## 2017-04-11 RX ADMIN — SODIUM CHLORIDE 12.5 MG/HR: 900 INJECTION, SOLUTION INTRAVENOUS at 23:17

## 2017-04-11 RX ADMIN — Medication 10 ML: at 05:50

## 2017-04-11 RX ADMIN — SODIUM CHLORIDE 10 MG/HR: 900 INJECTION, SOLUTION INTRAVENOUS at 06:05

## 2017-04-11 RX ADMIN — POTASSIUM CHLORIDE 40 MEQ: 20 TABLET, EXTENDED RELEASE ORAL at 09:41

## 2017-04-11 RX ADMIN — LORATADINE 10 MG: 10 TABLET ORAL at 13:38

## 2017-04-11 RX ADMIN — RIVAROXABAN 20 MG: 20 TABLET, FILM COATED ORAL at 17:28

## 2017-04-11 RX ADMIN — SOTALOL HYDROCHLORIDE 160 MG: 80 TABLET ORAL at 21:12

## 2017-04-11 RX ADMIN — GUAIFENESIN 600 MG: 600 TABLET, EXTENDED RELEASE ORAL at 21:12

## 2017-04-11 RX ADMIN — Medication 10 ML: at 21:13

## 2017-04-11 RX ADMIN — GUAIFENESIN 600 MG: 600 TABLET, EXTENDED RELEASE ORAL at 13:38

## 2017-04-11 NOTE — ED NOTES
TRANSFER - OUT REPORT:    Verbal report given to Grace Snider RN(name) on [employer].  being transferred to [facility](unit) for routine progression of care       Report consisted of patients Situation, Background, Assessment and   Recommendations(SBAR). Information from the following report(s) SBAR, ED Summary, [facility] and Recent Results was reviewed with the receiving nurse. Lines:   Peripheral IV 04/10/17 Left Antecubital (Active)   Site Assessment Clean, dry, & intact 4/10/2017  2:35 PM   Phlebitis Assessment 0 4/10/2017  2:35 PM   Infiltration Assessment 0 4/10/2017  2:35 PM   Dressing Status Clean, dry, & intact 4/10/2017  2:35 PM   Hub Color/Line Status Pink 4/10/2017  2:35 PM        Opportunity for questions and clarification was provided.       Patient transported with:   Registered Nurse

## 2017-04-11 NOTE — PROGRESS NOTES
TRANSFER - IN REPORT:    Verbal report received from Miko Cummins RN on [employer]. being received from ED for routine progression of care      Report consisted of patients Situation, Background, Assessment and Recommendations(SBAR). Information from the following report(s) SBAR, ED Summary, MAR, Med Rec Status and Cardiac Rhythm Uncontrolled Afib was reviewed with the receiving nurse. Opportunity for questions and clarification was provided. Assessment completed upon patients arrival to unit and care assumed. Patients skin clean and dry with no visible breakdown noted. Sacrum without breakdown.  Call light within reach and pt encouraged to call for assistance

## 2017-04-11 NOTE — PROGRESS NOTES
[facility] CARDIOLOGY PROGRESS NOTE           4/11/2017 7:07 AM    Admit Date: 4/10/2017    Subjective:   Still in Afib, rates better now. Mod MVP and MR on outside echo. No CP, pressure. Feeling better. Been dealing with URI like sx and cough. Coughed most of the night he claims today. Lying flat in bed, no orthopnea, PND. Been on xarelto for several yrs, claims he may have missed one dose a few weeks ago. Started on sotalol last night by Dr. Kyra Sullivan. ROS:  GEN:  No fever or chills  Cardiovascular:  As noted above  Pulmonary:  As noted above  Neuro:  No new focal motor or sensory loss    Objective:      Vitals:    04/10/17 2209 04/10/17 2306 04/11/17 0138 04/11/17 0454   BP: 164/81 143/78 133/68 128/80   Pulse: 95 99 (!) 113 (!) 106   Resp: 22  18 18   Temp: 98.3 °F (36.8 °C)  97.3 °F (36.3 °C) 97.3 °F (36.3 °C)   SpO2: 97%  98% 98%   Weight: 90.6 kg (199 lb 12.8 oz)   89.5 kg (197 lb 6.4 oz)   Height: 5' 11\" (1.803 m)          Physical Exam:  General-A and O x 3  Neck- supple, no JVD  CV- irreg irreg,  no MRG  Lung- clear bilaterally  Abd- soft, nontender, nondistended  Ext- no edema bilaterally. Skin- warm and dry  Psychiatric:  Normal mood and affect. Neurologic:  Alert and oriented X 3      Data Review:   Recent Labs      04/11/17   0350  04/10/17   2203  04/10/17   1420   NA  143   --   142   K  3.3*   --   3.8   MG  2.1  2.4  2.2   BUN  15   --   16   CREA  0.97   --   1.04   GLU  103*   --   98   WBC  6.2   --   7.3   HGB  11.3*   --   11.4*   HCT  35.3*   --   34.7*   PLT  170   --   168   TROIQ  0.41*  0.43*   --    CHOL  140   --    --    TGL  43   --    --    LDLC  80.4   --    --    HDL  51   --    --        TELEMETRY:  Afib    Assessment/Plan:     Principal Problem:    Atrial fibrillation with RVR (HCC) (5/26/2009): sotalol started last night, on xarelto as well. TSH low, cough ongoing with possible URI. Flu negative.   Will consult hospitalist, need to address ongoing URI sx and abnormal TSH before attempted cardioversion. For now, will continue sotalol loading and possible cardioversion tomorrow or Thurs if remains in Afib. Continue xarelto. Need to check echo, eval MR and MVP seen on outside echo. Follow K and Mg. Active Problems:    HTN (hypertension) (5/26/2009): better, follow for now. Mixed hyperlipidemia ()      GERD (gastroesophageal reflux disease) (6/30/2015): Add PPI today. Cough: as above, flu neg    HypoK: replace and recheck, check Mg as well    MVP/MR: check echo today. Pos trop: no CP, likely supply demand from Afib with RVR. Plan on echo. LDL 80. Likely will need outpatient NST. Follow on tele for now.            Flakita Nunez DO  4/11/2017 7:07 AM

## 2017-04-11 NOTE — PROGRESS NOTES
Verbal and bedside shift change report given to [facility], RN.  Cardizem gtt verified at bedside

## 2017-04-11 NOTE — CONSULTS
HOSPITALIST CONSULT      NAME:  Rakesh Sam. Age:  68 y.o.  :   1940   MRN:   [id number]    PCP: Roderick Toledo MD    Attending MD: Serjio Weems MD    Treatment Team: Attending Provider: Destinee Helm MD; Consulting Provider: Serjio Weems MD; Utilization Review: Beronica Tsai    HPI:     Rakesh Sam. is a 2XLU admitted to cardiology on 4/10 with AFib with RVR. Hospitalist consulted for evaluation of thyroid. TSH on admission <0.005. FT4 elevated at 2. He and his wife report that he has been on tapazole for about 3 years. He was on 10mg for years, and was switched to 5mg about 3mths ago because his TSH was so well controlled. He is followed by outpatient endocrinology. He reports compliance with his medication and only missed dosages for the last 2 days while inpatient. He also complains of cough, dry, nonproductive. Started a few days ago, not associated with fever/chills. Associated with sore throat and nasal congestion.      Complete ROS done and is as stated in HPI or otherwise negative    Past Medical History:   Diagnosis Date    Abdominal pain, chronic, right lower quadrant 3/24/2015    Adhesive capsulitis of shoulder     Anticoagulation monitoring, INR range 2-3 2009    Atrial fibrillation (HCC)     Atrial flutter (HCC) 2014    Carbuncle and furuncle of trunk     Carbuncle and furuncle of unspecified site     Closed fracture of one rib     Congestive heart failure, unspecified     Contact dermatitis and other eczema, due to unspecified cause     Contusion of knee     Dementia     Diarrhea     Dizziness and giddiness     Encounter for long-term (current) use of other medications     GERD (gastroesophageal reflux disease) 2015    Goiter, unspecified     H/O prostate cancer 2014    S/p prostatectomy ()     Hearing impaired 2015    HTN (hypertension) 2009    Intracranial meningioma ([facility] [address] 2014    Left ventricular hypertrophy 5/28/2009    Long term current use of anticoagulant therapy     Mitral valve regurgitation 5/28/2009    Mixed hyperlipidemia     Nodular goiter, toxic or with hyperthyroidism 5/28/2009    Other specified congenital anomaly of heart     Other symptoms involving cardiovascular system     Pain in joint, lower leg     Pain in joint, shoulder region     Pain in limb     Pure hypercholesterolemia     Rhinitis due to pollen 3/24/2015    SIRS (systemic inflammatory response syndrome) ([facility] [address] 7/22/2014    Thyrotoxicosis without mention of goiter or other cause, without mention of thyrotoxic crisis or storm     Unspecified hypothyroidism         Past Surgical History:   Procedure Laterality Date    BREAST SURGERY PROCEDURE UNLISTED      left breast cyst removal    HX COLONOSCOPY  9/23/2015    colon polyps, gastric polyps    HX PROSTATECTOMY          Prior to Admission Medications   Prescriptions Last Dose Informant Patient Reported? Taking? L. gasseri-B. bifidum-B longum 1.5 billion cell cap   Yes No   Sig: Take  by mouth as needed. diltiazem CD (CARDIZEM CD) 120 mg ER capsule   No No   Sig: TAKE ONE CAPSULE BY MOUTH TWICE A DAY   methimazole (TAPAZOLE) 10 mg tablet   Yes No   Sig: Take 5 mg by mouth daily. metoprolol tartrate (LOPRESSOR) 50 mg tablet Unknown at Unknown time  No No   Sig: TAKE 1 TABLET BY MOUTH TWICE A DAY   rivaroxaban (XARELTO) 20 mg tab tablet   No No   Sig: Take 1 Tab by mouth daily (with dinner).  Indications: PREVENT THROMBOEMBOLISM IN CHRONIC ATRIAL FIBRILLATION      Facility-Administered Medications: None       Allergies   Allergen Reactions    Codeine Anaphylaxis    Doxazosin Mesylate Rash     \"broke out in a sweat\"    Lotrel [Amlodipine-Benazepril] Cough    Pcn [Penicillins] Anaphylaxis        Social History   Substance Use Topics    Smoking status: Never Smoker    Smokeless tobacco: Never Used    Alcohol use No      Comment: quit        Family History   Problem Relation Age of Onset    Heart Attack Mother     Cancer Father      thyroid    Alcohol abuse Father     Hypertension Sister     Rashes/Skin Problems Brother     Asthma Sister     Hypertension Brother     Heart Disease Other         Objective:       Visit Vitals    /80    Pulse 97    Temp 98.3 °F (36.8 °C)    Resp 20    Ht 5' 11\" (1.803 m)    Wt 89.5 kg (197 lb 6.4 oz)    SpO2 97%    BMI 27.53 kg/m2        Temp (24hrs), Av.8 °F (36.6 °C), Min:97.3 °F (36.3 °C), Max:98.3 °F (36.8 °C)      Oxygen Therapy  O2 Sat (%): 97 % (17)  Pulse via Oximetry: 116 beats per minute (04/10/17 2015)  O2 Device: Room air (17)      Physical Exam:      General:    Alert, cooperative, no distress     Eyes:   Anicteric  Head:   Normocephalic, without obvious abnormality, atraumatic. ENT:  Mild nasal drainage, mild erythema of OP  Lungs:   Clear to auscultation bilaterally. Nonlabored  Heart:   irreg irreg  Abdomen:   Soft, NTTP  MSK:   Spontaneously moves extremities. Skin:     Texture, turgor normal.  Not Jaundiced. Neurologic: Alert and oriented x 3, no focal deficits   Psychiatry:      No depression/anxiety. Mood congruent for context. Heme/Lymph/Immune: No petechiae, echymoses, overt signs of bleeding or lymphadenopathy. Data Review:   Recent Results (from the past 24 hour(s))   EKG, 12 LEAD, INITIAL    Collection Time: 04/10/17  2:13 PM   Result Value Ref Range    Ventricular Rate 103 BPM    Atrial Rate 81 BPM    QRS Duration 96 ms    Q-T Interval 340 ms    QTC Calculation (Bezet) 445 ms    Calculated R Axis 19 degrees    Calculated T Axis 26 degrees    Diagnosis       !! AGE AND GENDER SPECIFIC ECG ANALYSIS !! Atrial fibrillation with rapid ventricular response  Minimal voltage criteria for LVH, may be normal variant  Septal infarct (cited on or before 26-MAY-2009)  Abnormal ECG  When compared with ECG of 2014 18:42,  Atrial fibrillation has replaced Sinus rhythm  Vent.  rate has increased BY  46 BPM  Confirmed by Garima Silver ([id number]) on 4/10/2017 4:42:04 PM     CBC WITH AUTOMATED DIFF    Collection Time: 04/10/17  2:20 PM   Result Value Ref Range    WBC 7.3 4.3 - 11.1 K/uL    RBC 4.22 (L) 4.23 - 5.67 M/uL    HGB 11.4 (L) 13.6 - 17.2 g/dL    HCT 34.7 (L) 41.1 - 50.3 %    MCV 82.2 79.6 - 97.8 FL    MCH 27.0 26.1 - 32.9 PG    MCHC 32.9 31.4 - 35.0 g/dL    RDW 14.0 11.9 - 14.6 %    PLATELET 215 288 - 296 K/uL    MPV 12.8 10.8 - 14.1 FL    DF AUTOMATED      NEUTROPHILS 80 (H) 43 - 78 %    LYMPHOCYTES 12 (L) 13 - 44 %    MONOCYTES 8 4.0 - 12.0 %    EOSINOPHILS 0 (L) 0.5 - 7.8 %    BASOPHILS 0 0.0 - 2.0 %    IMMATURE GRANULOCYTES 0.1 0.0 - 5.0 %    ABS. NEUTROPHILS 5.8 1.7 - 8.2 K/UL    ABS. LYMPHOCYTES 0.9 0.5 - 4.6 K/UL    ABS. MONOCYTES 0.6 0.1 - 1.3 K/UL    ABS. EOSINOPHILS 0.0 0.0 - 0.8 K/UL    ABS. BASOPHILS 0.0 0.0 - 0.2 K/UL    ABS. IMM. GRANS. 0.0 0.0 - 0.5 K/UL   METABOLIC PANEL, COMPREHENSIVE    Collection Time: 04/10/17  2:20 PM   Result Value Ref Range    Sodium 142 136 - 145 mmol/L    Potassium 3.8 3.5 - 5.1 mmol/L    Chloride 108 (H) 98 - 107 mmol/L    CO2 25 21 - 32 mmol/L    Anion gap 9 7 - 16 mmol/L    Glucose 98 65 - 100 mg/dL    BUN 16 8 - 23 MG/DL    Creatinine 1.04 0.8 - 1.5 MG/DL    GFR est AA >60 >60 ml/min/1.73m2    GFR est non-AA >60 >60 ml/min/1.73m2    Calcium 9.6 8.3 - 10.4 MG/DL    Bilirubin, total 0.4 0.2 - 1.1 MG/DL    ALT (SGPT) 45 12 - 65 U/L    AST (SGOT) 21 15 - 37 U/L    Alk.  phosphatase 84 50 - 136 U/L    Protein, total 6.6 6.3 - 8.2 g/dL    Albumin 3.2 3.2 - 4.6 g/dL    Globulin 3.4 2.3 - 3.5 g/dL    A-G Ratio 0.9 (L) 1.2 - 3.5     MAGNESIUM    Collection Time: 04/10/17  2:20 PM   Result Value Ref Range    Magnesium 2.2 1.8 - 2.4 mg/dL   TSH 3RD GENERATION    Collection Time: 04/10/17  2:20 PM   Result Value Ref Range    TSH <0.005 (L) 0.358 - 3.740 uIU/mL   BNP    Collection Time: 04/10/17  2:20 PM   Result Value Ref Range     pg/mL   POC TROPONIN-I    Collection Time: 04/10/17  2:37 PM   Result Value Ref Range    Troponin-I (POC) 0.3 (H) 0.0 - 0.08 ng/ml   INFLUENZA A & B AG (RAPID TEST)    Collection Time: 04/10/17  4:00 PM   Result Value Ref Range    Influenza A Ag NEGATIVE  NEG      Influenza B Ag NEGATIVE  NEG     POC TROPONIN-I    Collection Time: 04/10/17  5:57 PM   Result Value Ref Range    Troponin-I (POC) 0.34 (H) 0.0 - 0.08 ng/ml   EKG, 12 LEAD, SUBSEQUENT    Collection Time: 04/10/17  6:00 PM   Result Value Ref Range    Ventricular Rate 93 BPM    Atrial Rate 113 BPM    QRS Duration 100 ms    Q-T Interval 338 ms    QTC Calculation (Bezet) 420 ms    Calculated R Axis 20 degrees    Calculated T Axis 17 degrees    Diagnosis       !! AGE AND GENDER SPECIFIC ECG ANALYSIS !!   Atrial fibrillation with premature ventricular or aberrantly conducted   complexes  Minimal voltage criteria for LVH, may be normal variant  Septal infarct (cited on or before 26-MAY-2009)  Abnormal ECG  When compared with ECG of 10-APR-2017 14:13,  No significant change was found  Confirmed by ANGELINA DUFF (), [employer] ([id number]) on 4/10/2017 10:01:15 PM     MAGNESIUM    Collection Time: 04/10/17 10:03 PM   Result Value Ref Range    Magnesium 2.4 1.8 - 2.4 mg/dL   TROPONIN I    Collection Time: 04/10/17 10:03 PM   Result Value Ref Range    Troponin-I, Qt. 0.43 (HH) 0.02 - 0.05 NG/ML   EKG, 12 LEAD, SUBSEQUENT    Collection Time: 04/11/17  1:07 AM   Result Value Ref Range    Ventricular Rate 95 BPM    Atrial Rate 241 BPM    QRS Duration 104 ms    Q-T Interval 354 ms    QTC Calculation (Bezet) 444 ms    Calculated R Axis 11 degrees    Calculated T Axis 26 degrees    Diagnosis       Atrial fibrillation with premature ventricular or aberrantly conducted   complexes  Minimal voltage criteria for LVH, may be normal variant  Septal infarct (cited on or before 26-MAY-2009)  T wave abnormality, consider lateral ischemia or digitalis effect  Abnormal ECG  When compared with ECG of 10-APR-2017 18:00,  Questionable change in initial forces of Septal leads  Inverted T waves have replaced nonspecific T wave abnormality in Lateral   leads  Confirmed by ANGELINA DUFF (), CHASE CAMPOS ([id number]) on 4/11/2017 2:55:82 AM     METABOLIC PANEL, BASIC    Collection Time: 04/11/17  3:50 AM   Result Value Ref Range    Sodium 143 136 - 145 mmol/L    Potassium 3.3 (L) 3.5 - 5.1 mmol/L    Chloride 106 98 - 107 mmol/L    CO2 30 21 - 32 mmol/L    Anion gap 7 7 - 16 mmol/L    Glucose 103 (H) 65 - 100 mg/dL    BUN 15 8 - 23 MG/DL    Creatinine 0.97 0.8 - 1.5 MG/DL    GFR est AA >60 >60 ml/min/1.73m2    GFR est non-AA >60 >60 ml/min/1.73m2    Calcium 9.1 8.3 - 10.4 MG/DL   LIPID PANEL    Collection Time: 04/11/17  3:50 AM   Result Value Ref Range    LIPID PROFILE          Cholesterol, total 140 <200 MG/DL    Triglyceride 43 35 - 150 MG/DL    HDL Cholesterol 51 40 - 60 MG/DL    LDL, calculated 80.4 <100 MG/DL    VLDL, calculated 8.6 6.0 - 23.0 MG/DL    CHOL/HDL Ratio 2.7     CBC W/O DIFF    Collection Time: 04/11/17  3:50 AM   Result Value Ref Range    WBC 6.2 4.3 - 11.1 K/uL    RBC 4.22 (L) 4.23 - 5.67 M/uL    HGB 11.3 (L) 13.6 - 17.2 g/dL    HCT 35.3 (L) 41.1 - 50.3 %    MCV 83.6 79.6 - 97.8 FL    MCH 26.8 26.1 - 32.9 PG    MCHC 32.0 31.4 - 35.0 g/dL    RDW 13.9 11.9 - 14.6 %    PLATELET 303 168 - 353 K/uL    MPV 12.5 10.8 - 14.1 FL   TROPONIN I    Collection Time: 04/11/17  3:50 AM   Result Value Ref Range    Troponin-I, Qt. 0.41 (HH) 0.02 - 0.05 NG/ML   MAGNESIUM    Collection Time: 04/11/17  3:50 AM   Result Value Ref Range    Magnesium 2.1 1.8 - 2.4 mg/dL   T4, FREE    Collection Time: 04/11/17  3:50 AM   Result Value Ref Range    T4, Free 2.0 (H) 0.78 - 1.46 NG/DL   EKG, 12 LEAD, SUBSEQUENT    Collection Time: 04/11/17 11:43 AM   Result Value Ref Range    Ventricular Rate 80 BPM    Atrial Rate 312 BPM    QRS Duration 102 ms    Q-T Interval 410 ms    QTC Calculation (Bezet) 472 ms    Calculated R Axis 16 degrees Calculated T Axis 3 degrees    Diagnosis       Atrial fibrillation with a competing junctional pacemaker  Minimal voltage criteria for LVH, may be normal variant  Nonspecific ST and T wave abnormality , probably digitalis effect  Prolonged QT  Abnormal ECG  When compared with ECG of 11-APR-2017 01:07,  Criteria for Septal infarct are no longer Present  Non-specific change in ST segment in Lateral leads  Nonspecific T wave abnormality has replaced inverted T waves in Lateral leads         Imaging /Procedures /Studies   XR CHEST PORT   Final Result   IMPRESSION: No acute findings in the chest             Assessment and Plan: Active Hospital Problems    Diagnosis Date Noted    Hyperthyroidism 04/11/2017    GERD (gastroesophageal reflux disease) 06/30/2015    Mixed hyperlipidemia     HTN (hypertension) 05/26/2009    Atrial fibrillation with RVR ([address] 05/26/2009       PLAN  - Hyperthyroidism: increase tapazole back to 10mg (was on this dose until about 3mths ago). Better control of hyperthyroidism should improve AFib as well. Needs followup with endocrinology about 2 weeks after discharge    -  Dry cough: allergies vs viral URI. Start claritin daily and mucinex BID. No s/s concerning for lower resp tract infection or bacterial infection that would require abx    Thank you for this consult. We will sign off but please call with any other issues or concerns.        Britney Zapata MD  8:35 AM

## 2017-04-12 LAB
ANION GAP BLD CALC-SCNC: 7 MMOL/L (ref 7–16)
ATRIAL RATE: 133 BPM
ATRIAL RATE: 166 BPM
BNP SERPL-MCNC: 347 PG/ML
BUN SERPL-MCNC: 16 MG/DL (ref 8–23)
CALCIUM SERPL-MCNC: 9.5 MG/DL (ref 8.3–10.4)
CALCULATED R AXIS, ECG10: 2 DEGREES
CALCULATED R AXIS, ECG10: 7 DEGREES
CALCULATED T AXIS, ECG11: 13 DEGREES
CALCULATED T AXIS, ECG11: 57 DEGREES
CHLORIDE SERPL-SCNC: 108 MMOL/L (ref 98–107)
CO2 SERPL-SCNC: 27 MMOL/L (ref 21–32)
CREAT SERPL-MCNC: 0.88 MG/DL (ref 0.8–1.5)
DIAGNOSIS, 93000: NORMAL
DIAGNOSIS, 93000: NORMAL
ERYTHROCYTE [DISTWIDTH] IN BLOOD BY AUTOMATED COUNT: 14.1 % (ref 11.9–14.6)
GLUCOSE SERPL-MCNC: 114 MG/DL (ref 65–100)
HCT VFR BLD AUTO: 34 % (ref 41.1–50.3)
HGB BLD-MCNC: 11.4 G/DL (ref 13.6–17.2)
MAGNESIUM SERPL-MCNC: 2 MG/DL (ref 1.8–2.4)
MCH RBC QN AUTO: 27.1 PG (ref 26.1–32.9)
MCHC RBC AUTO-ENTMCNC: 33.5 G/DL (ref 31.4–35)
MCV RBC AUTO: 81 FL (ref 79.6–97.8)
PLATELET # BLD AUTO: 192 K/UL (ref 150–450)
PMV BLD AUTO: 12.2 FL (ref 10.8–14.1)
POTASSIUM SERPL-SCNC: 3.9 MMOL/L (ref 3.5–5.1)
Q-T INTERVAL, ECG07: 332 MS
Q-T INTERVAL, ECG07: 376 MS
QRS DURATION, ECG06: 106 MS
QRS DURATION, ECG06: 94 MS
QTC CALCULATION (BEZET), ECG08: 470 MS
QTC CALCULATION (BEZET), ECG08: 480 MS
RBC # BLD AUTO: 4.2 M/UL (ref 4.23–5.67)
SODIUM SERPL-SCNC: 142 MMOL/L (ref 136–145)
VENTRICULAR RATE, ECG03: 126 BPM
VENTRICULAR RATE, ECG03: 94 BPM
WBC # BLD AUTO: 7.7 K/UL (ref 4.3–11.1)

## 2017-04-12 PROCEDURE — 80048 BASIC METABOLIC PNL TOTAL CA: CPT | Performed by: NURSE PRACTITIONER

## 2017-04-12 PROCEDURE — 83880 ASSAY OF NATRIURETIC PEPTIDE: CPT | Performed by: INTERNAL MEDICINE

## 2017-04-12 PROCEDURE — 65660000000 HC RM CCU STEPDOWN

## 2017-04-12 PROCEDURE — 74011250637 HC RX REV CODE- 250/637: Performed by: INTERNAL MEDICINE

## 2017-04-12 PROCEDURE — 93005 ELECTROCARDIOGRAM TRACING: CPT | Performed by: NURSE PRACTITIONER

## 2017-04-12 PROCEDURE — 83735 ASSAY OF MAGNESIUM: CPT | Performed by: NURSE PRACTITIONER

## 2017-04-12 PROCEDURE — 85027 COMPLETE CBC AUTOMATED: CPT | Performed by: NURSE PRACTITIONER

## 2017-04-12 PROCEDURE — 74011000258 HC RX REV CODE- 258: Performed by: NURSE PRACTITIONER

## 2017-04-12 PROCEDURE — 36415 COLL VENOUS BLD VENIPUNCTURE: CPT | Performed by: NURSE PRACTITIONER

## 2017-04-12 PROCEDURE — 74011000250 HC RX REV CODE- 250: Performed by: NURSE PRACTITIONER

## 2017-04-12 PROCEDURE — 74011250637 HC RX REV CODE- 250/637: Performed by: NURSE PRACTITIONER

## 2017-04-12 RX ADMIN — Medication 5 ML: at 14:45

## 2017-04-12 RX ADMIN — DILTIAZEM HYDROCHLORIDE 15 MG/HR: 5 INJECTION INTRAVENOUS at 06:26

## 2017-04-12 RX ADMIN — GUAIFENESIN 600 MG: 600 TABLET, EXTENDED RELEASE ORAL at 21:13

## 2017-04-12 RX ADMIN — PANTOPRAZOLE SODIUM 40 MG: 40 TABLET, DELAYED RELEASE ORAL at 08:28

## 2017-04-12 RX ADMIN — Medication 10 ML: at 05:40

## 2017-04-12 RX ADMIN — RIVAROXABAN 20 MG: 20 TABLET, FILM COATED ORAL at 16:55

## 2017-04-12 RX ADMIN — SOTALOL HYDROCHLORIDE 160 MG: 80 TABLET ORAL at 21:13

## 2017-04-12 RX ADMIN — Medication 10 ML: at 21:15

## 2017-04-12 RX ADMIN — GUAIFENESIN 600 MG: 600 TABLET, EXTENDED RELEASE ORAL at 08:59

## 2017-04-12 RX ADMIN — LORATADINE 10 MG: 10 TABLET ORAL at 08:59

## 2017-04-12 RX ADMIN — METHIMAZOLE 10 MG: 10 TABLET ORAL at 08:59

## 2017-04-12 RX ADMIN — DILTIAZEM HYDROCHLORIDE 10 MG/HR: 5 INJECTION INTRAVENOUS at 16:55

## 2017-04-12 RX ADMIN — SOTALOL HYDROCHLORIDE 160 MG: 80 TABLET ORAL at 08:59

## 2017-04-12 NOTE — PROGRESS NOTES
Spoke with Dr. Nitesh Wilder regarding HR of 130's to 140's. Per Dr. Nitesh Wilder, restart cardizem gtt at 5 ml/h and titrate as needed per previous order.

## 2017-04-12 NOTE — PROGRESS NOTES
Bedside report received from [facility]. Pt resting in bed. Cardizem gtt at 10cc/hr verified. WIll monitor.

## 2017-04-12 NOTE — PROGRESS NOTES
[facility] CARDIOLOGY PROGRESS NOTE           4/12/2017 7:07 AM    Admit Date: 4/10/2017    Subjective:   Still in Afib, rates better now. Mod MVP and severe eccentric MR on echo yesterday. EF normal.  No CP, pressure. Feeling better. Been dealing with URI like sx and cough. Coughed most of the night he claims today. Lying flat in bed, no orthopnea, PND. Been on xarelto for several yrs, claims he may have missed one dose a few weeks ago. Started on sotalol 2 nights ago by Dr. Idalia Obrien. Thyroid med adjusted yesterday. ROS:  GEN:  No fever or chills  Cardiovascular:  As noted above  Pulmonary:  As noted above  Neuro:  No new focal motor or sensory loss    Objective:      Vitals:    04/11/17 2200 04/12/17 0058 04/12/17 0523 04/12/17 0810   BP:  152/87 135/69 (!) 160/97   Pulse: (!) 136 (!) 116 (!) 127 (!) 118   Resp:  18 18 20   Temp:  98.5 °F (36.9 °C) 98.5 °F (36.9 °C) 97 °F (36.1 °C)   SpO2:  97% 96% 97%   Weight:   90.3 kg (199 lb 1.6 oz)    Height:           Physical Exam:  General-A and O x 3  Neck- supple, no JVD  CV- irreg irreg,  Tachy, no MRG  Lung- clear bilaterally  Abd- soft, nontender, nondistended  Ext- no edema bilaterally. Skin- warm and dry  Psychiatric:  Normal mood and affect. Neurologic:  Alert and oriented X 3      Data Review:   Recent Labs      04/12/17   0608  04/11/17   0350  04/10/17   2203   NA  142  143   --    K  3.9  3.3*   --    MG  2.0  2.1  2.4   BUN  16  15   --    CREA  0.88  0.97   --    GLU  114*  103*   --    WBC  7.7  6.2   --    HGB  11.4*  11.3*   --    HCT  34.0*  35.3*   --    PLT  192  170   --    TROIQ   --   0.41*  0.43*   CHOL   --   140   --    TGL   --   43   --    LDLC   --   80.4   --    HDL   --   51   --        TELEMETRY:  Afib with RVR    Assessment/Plan:     Principal Problem:    Atrial fibrillation with RVR (HCC) (5/26/2009): sotalol started 2 night ago, on xarelto as well. TSH low, cough ongoing with possible URI.   Flu negative. Appreciate hospitalist assistance. For now, will continue sotalol loading and possible cardioversion tomorrow with SHWETHA to eval MVP and MR. Let cough get better and thyroid be adjusted. Continue xarelto. Follow K and Mg. Active Problems:    HTN (hypertension) (5/26/2009): better, follow for now. Mixed hyperlipidemia ()      GERD (gastroesophageal reflux disease) (6/30/2015): Add PPI today. Cough: as above, flu neg, appreciate hosp consult    HypoK: replace and recheck, check Mg as well    MVP/MR: MR severe and eccentric.  EF normal.  LV size normal.  RVSP 55mmHg. May be candidate for MV repair in the future. SHWETHA tomorrow and then cardioversion. Pos trop: no CP, likely supply demand from Afib with RVR. EF normal.  LDL 80. Likely will need outpatient NST. Follow on tele for now. No angina now.            Libertad Mccarthy DO  4/12/2017 8:20AM

## 2017-04-12 NOTE — PROGRESS NOTES
Bedside and Verbal shift change report given to self (oncoming nurse) by Yonisraymon Trujillo RN (offgoing nurse). Report included the following information SBAR, Kardex, MAR and Recent Results. Cardizem gtt verified at bedside per eMAR.

## 2017-04-12 NOTE — PROGRESS NOTES
Bedside and Verbal shift change report given to [address] JAMMIE Garza (oncoming nurse) by self (offgoing nurse). Report included the following information SBAR, Kardex, MAR and Recent Results. Cardizem gtt verified at bedside.

## 2017-04-12 NOTE — PROGRESS NOTES
Problem: Falls - Risk of  Goal: *Absence of falls  Outcome: Progressing Towards Goal  Non-skid socks on; bed low and locked; call light in reach.  Pt encouraged to call for assistance

## 2017-04-12 NOTE — PROGRESS NOTES
MEWS score noted to be 3. Charge nurse, Matilda Prasad RN informed and assessed patient. High MEWS score noted due to increase HR. .  Will continue to monitor.

## 2017-04-13 ENCOUNTER — APPOINTMENT (OUTPATIENT)
Dept: GENERAL RADIOLOGY | Age: 77
DRG: 310 | End: 2017-04-13
Attending: INTERNAL MEDICINE
Payer: MEDICARE

## 2017-04-13 LAB
ANION GAP BLD CALC-SCNC: 7 MMOL/L (ref 7–16)
ATRIAL RATE: 59 BPM
ATRIAL RATE: 81 BPM
BUN SERPL-MCNC: 14 MG/DL (ref 8–23)
CALCIUM SERPL-MCNC: 9.2 MG/DL (ref 8.3–10.4)
CALCULATED P AXIS, ECG09: 56 DEGREES
CALCULATED R AXIS, ECG10: 18 DEGREES
CALCULATED R AXIS, ECG10: 18 DEGREES
CALCULATED T AXIS, ECG11: 46 DEGREES
CALCULATED T AXIS, ECG11: 56 DEGREES
CHLORIDE SERPL-SCNC: 108 MMOL/L (ref 98–107)
CO2 SERPL-SCNC: 27 MMOL/L (ref 21–32)
CREAT SERPL-MCNC: 0.92 MG/DL (ref 0.8–1.5)
DIAGNOSIS, 93000: NORMAL
DIAGNOSIS, 93000: NORMAL
ERYTHROCYTE [DISTWIDTH] IN BLOOD BY AUTOMATED COUNT: 14 % (ref 11.9–14.6)
GLUCOSE SERPL-MCNC: 108 MG/DL (ref 65–100)
HCT VFR BLD AUTO: 33.6 % (ref 41.1–50.3)
HGB BLD-MCNC: 10.8 G/DL (ref 13.6–17.2)
MAGNESIUM SERPL-MCNC: 2.1 MG/DL (ref 1.8–2.4)
MCH RBC QN AUTO: 26.9 PG (ref 26.1–32.9)
MCHC RBC AUTO-ENTMCNC: 32.1 G/DL (ref 31.4–35)
MCV RBC AUTO: 83.6 FL (ref 79.6–97.8)
P-R INTERVAL, ECG05: 198 MS
PLATELET # BLD AUTO: 188 K/UL (ref 150–450)
PMV BLD AUTO: 11.9 FL (ref 10.8–14.1)
POTASSIUM SERPL-SCNC: 3.9 MMOL/L (ref 3.5–5.1)
Q-T INTERVAL, ECG07: 366 MS
Q-T INTERVAL, ECG07: 450 MS
QRS DURATION, ECG06: 96 MS
QRS DURATION, ECG06: 98 MS
QTC CALCULATION (BEZET), ECG08: 445 MS
QTC CALCULATION (BEZET), ECG08: 474 MS
RBC # BLD AUTO: 4.02 M/UL (ref 4.23–5.67)
SODIUM SERPL-SCNC: 142 MMOL/L (ref 136–145)
VENTRICULAR RATE, ECG03: 101 BPM
VENTRICULAR RATE, ECG03: 59 BPM
WBC # BLD AUTO: 7.5 K/UL (ref 4.3–11.1)

## 2017-04-13 PROCEDURE — 71020 XR CHEST PA LAT: CPT

## 2017-04-13 PROCEDURE — 93312 ECHO TRANSESOPHAGEAL: CPT

## 2017-04-13 PROCEDURE — 36415 COLL VENOUS BLD VENIPUNCTURE: CPT | Performed by: NURSE PRACTITIONER

## 2017-04-13 PROCEDURE — 65660000000 HC RM CCU STEPDOWN

## 2017-04-13 PROCEDURE — 74011250637 HC RX REV CODE- 250/637: Performed by: INTERNAL MEDICINE

## 2017-04-13 PROCEDURE — 83735 ASSAY OF MAGNESIUM: CPT | Performed by: NURSE PRACTITIONER

## 2017-04-13 PROCEDURE — 74011000258 HC RX REV CODE- 258: Performed by: NURSE PRACTITIONER

## 2017-04-13 PROCEDURE — 5A2204Z RESTORATION OF CARDIAC RHYTHM, SINGLE: ICD-10-PCS | Performed by: INTERNAL MEDICINE

## 2017-04-13 PROCEDURE — 85027 COMPLETE CBC AUTOMATED: CPT | Performed by: NURSE PRACTITIONER

## 2017-04-13 PROCEDURE — 92960 CARDIOVERSION ELECTRIC EXT: CPT

## 2017-04-13 PROCEDURE — 99152 MOD SED SAME PHYS/QHP 5/>YRS: CPT

## 2017-04-13 PROCEDURE — 93005 ELECTROCARDIOGRAM TRACING: CPT | Performed by: INTERNAL MEDICINE

## 2017-04-13 PROCEDURE — 80048 BASIC METABOLIC PNL TOTAL CA: CPT | Performed by: NURSE PRACTITIONER

## 2017-04-13 PROCEDURE — 74011250637 HC RX REV CODE- 250/637: Performed by: NURSE PRACTITIONER

## 2017-04-13 PROCEDURE — 74011000250 HC RX REV CODE- 250: Performed by: NURSE PRACTITIONER

## 2017-04-13 PROCEDURE — 74011250636 HC RX REV CODE- 250/636

## 2017-04-13 PROCEDURE — 74011250636 HC RX REV CODE- 250/636: Performed by: INTERNAL MEDICINE

## 2017-04-13 PROCEDURE — B246ZZ4 ULTRASONOGRAPHY OF RIGHT AND LEFT HEART, TRANSESOPHAGEAL: ICD-10-PCS | Performed by: INTERNAL MEDICINE

## 2017-04-13 RX ORDER — FENTANYL CITRATE 50 UG/ML
25-100 INJECTION, SOLUTION INTRAMUSCULAR; INTRAVENOUS
Status: DISCONTINUED | OUTPATIENT
Start: 2017-04-13 | End: 2017-04-13

## 2017-04-13 RX ORDER — SODIUM CHLORIDE 9 MG/ML
50 INJECTION, SOLUTION INTRAVENOUS CONTINUOUS
Status: DISCONTINUED | OUTPATIENT
Start: 2017-04-13 | End: 2017-04-14 | Stop reason: HOSPADM

## 2017-04-13 RX ORDER — MIDAZOLAM HYDROCHLORIDE 1 MG/ML
1-6 INJECTION, SOLUTION INTRAMUSCULAR; INTRAVENOUS
Status: DISCONTINUED | OUTPATIENT
Start: 2017-04-13 | End: 2017-04-13

## 2017-04-13 RX ADMIN — PANTOPRAZOLE SODIUM 40 MG: 40 TABLET, DELAYED RELEASE ORAL at 08:34

## 2017-04-13 RX ADMIN — Medication 10 ML: at 08:35

## 2017-04-13 RX ADMIN — DILTIAZEM HYDROCHLORIDE 10 MG/HR: 5 INJECTION INTRAVENOUS at 05:12

## 2017-04-13 RX ADMIN — GUAIFENESIN 600 MG: 600 TABLET, EXTENDED RELEASE ORAL at 08:34

## 2017-04-13 RX ADMIN — MIDAZOLAM HYDROCHLORIDE 4 MG: 1 INJECTION, SOLUTION INTRAMUSCULAR; INTRAVENOUS at 11:50

## 2017-04-13 RX ADMIN — METHIMAZOLE 10 MG: 10 TABLET ORAL at 08:34

## 2017-04-13 RX ADMIN — LORATADINE 10 MG: 10 TABLET ORAL at 08:35

## 2017-04-13 RX ADMIN — FENTANYL CITRATE 50 MCG: 50 INJECTION, SOLUTION INTRAMUSCULAR; INTRAVENOUS at 11:50

## 2017-04-13 RX ADMIN — Medication 5 ML: at 21:17

## 2017-04-13 RX ADMIN — Medication 10 ML: at 13:24

## 2017-04-13 RX ADMIN — GUAIFENESIN 600 MG: 600 TABLET, EXTENDED RELEASE ORAL at 21:16

## 2017-04-13 RX ADMIN — SODIUM CHLORIDE 50 ML/HR: 900 INJECTION, SOLUTION INTRAVENOUS at 08:30

## 2017-04-13 RX ADMIN — ACETAMINOPHEN 650 MG: 325 TABLET ORAL at 21:17

## 2017-04-13 RX ADMIN — RIVAROXABAN 20 MG: 20 TABLET, FILM COATED ORAL at 16:51

## 2017-04-13 RX ADMIN — SOTALOL HYDROCHLORIDE 160 MG: 80 TABLET ORAL at 08:56

## 2017-04-13 RX ADMIN — SOTALOL HYDROCHLORIDE 160 MG: 80 TABLET ORAL at 21:16

## 2017-04-13 NOTE — PROGRESS NOTES
Bedside and Verbal shift change report given to [facility]. Report included the following information SBAR, Kardex, MAR, Accordion and Recent Results.

## 2017-04-13 NOTE — PROGRESS NOTES
Bedside and Verbal shift change report given to Margaret Molina RN (oncoming nurse) by self and Sanju Schroeder (offgoing nurse). Report included the following information SBAR, Kardex, MAR and Recent Results.

## 2017-04-13 NOTE — PROGRESS NOTES
Bedside and Verbal shift change report given to self and Kaitlin Krueger RN (oncoming nurse) by Estelle Graham RN (offgoing nurse). Report included the following information SBAR, Kardex and Recent Results. Cardizem gtt confirmed at bedside. Pt aware of NPO status until further notice.

## 2017-04-13 NOTE — PROCEDURES
[address]       Name:  Ruma Vaughn   MR#:  [id number]   :  1940   Account #:  [de-identified]   Date of Adm:  04/10/2017       CLINICAL: This is a 19-year-old gentleman with a history of   mitral regurgitation, atrial fibrillation and has been on   anticoagulation. He has been loaded with sotalol. He has   maintained an atrial fibrillation with improved rate, but still   a rapid rate. He has a history of mild hyperthyroid. PROCEDURE: After transesophageal echocardiogram confirmed a   clear left atrial appendage, velocities 60 cm per second. The patient was sedated with 2 mg of Versed and 50 mg fentanyl. AP cardioversion was performed 150 joules, synchronized. Atrial   fibrillation to a sinus rhythm. Vital signs were stable. Rhythm and blood pressure, oxygen   saturations were monitored and stable.         MD Chan Otto / Esvin Pedroza   D:  2017   12:11   T:  2017   12:33   Job #:  [id number]

## 2017-04-13 NOTE — ROUTINE PROCESS
TRANSFER - OUT REPORT:    Verbal report given to Carito Arndt RN (name) on 3225 films.  being transferred to [address] [place] [address] ([facility]) for routine progression of care       Report consisted of patients Situation, Background, Assessment and   Recommendations(SBAR). Information from the following report(s) Procedure Summary, MAR and Recent Results was reviewed with the receiving nurse. Lines:   Peripheral IV 04/12/17 Right Wrist (Active)   Site Assessment Clean, dry, & intact 4/13/2017  7:33 AM   Phlebitis Assessment 0 4/13/2017  7:33 AM   Infiltration Assessment 0 4/13/2017  5:21 AM   Dressing Status Clean, dry, & intact 4/13/2017  7:33 AM   Dressing Type Transparent 4/13/2017  7:33 AM   Hub Color/Line Status Pink; Infusing 4/13/2017  7:33 AM   Alcohol Cap Used No 4/13/2017  7:33 AM        Opportunity for questions and clarification was provided.       Patient transported with:   Murali Arnett w/ Dr Renetta Hoffman  1 shock at 150 J to convert to NSR  Versed 4 mg IV  Fentanyl 50 mcg IV

## 2017-04-13 NOTE — PROGRESS NOTES
Spoke with Chris Perdue in pharmacy to verify NS and cardizem with D5 compatibility. She stated they were compatible. Will infuse together in same IV.

## 2017-04-13 NOTE — PROGRESS NOTES
Verbal and bedside shift change report received from [facility]

## 2017-04-13 NOTE — PROCEDURES
Brief Cardiac Procedure Note    Patient: Ana Suh. MRN: [id number]  SSN: [id number]    YOB: 1940  Age: 68 y.o. Sex: male      Date of Procedure: 4/13/2017     Pre-procedure Diagnosis: Atrial Fibrillation/Atrial Flutter    Post-procedure Diagnosis: SHWETHA CV Sinus Rhythm     Procedure: Transesophageal Echocardiogram with Cardioversion    Brief Description of Procedure: 3mg versed 50mg fentanyl     Performed By: Kandice Wilson MD     Assistants: none    Anesthesia: Moderate Sedation    Estimated Blood Loss: Less than 10 mL      Specimens: None    Implants: None    Findings: LVH EF 70% not dilated   posterior leaflet MVP moderate to severe eccentric MR  NANNETTE clear 60cm/sec contraction velocity     Complications: none    Recommendations: continue sotalol Xarelto   f/u MR as outpatient .     Signed By: Kandice Wilson MD     April 13, 2017

## 2017-04-13 NOTE — PROGRESS NOTES
Patient received into lab for SHWETHA/cvn. Patient was identified using name and date of birth. Pertinent information was reviewed including allergies, history, medications and lab work. Patient states that he has no questions concerning procedure. Signed consent is on chart as well current history and physical. IV access was checked for patency.

## 2017-04-13 NOTE — PROGRESS NOTES
TRANSFER - IN REPORT:    Verbal report received from Merrick Mccray (name) on [employer].  being received from Cath Lab(unit) for routine progression of care      Report consisted of patients Situation, Background, Assessment and   Recommendations(SBAR). Information from the following report(s) Procedure Summary was reviewed with the receiving nurse. Opportunity for questions and clarification was provided. Assessment completed upon patients arrival to unit and care assumed. Telemetry monitor applied, VS taken, pt in bed drowsy from procedure. Wife at bedside. Bed low and locked call light within reach side rails x2.  Patient educated on bedrest.

## 2017-04-13 NOTE — PROGRESS NOTES
[facility] CARDIOLOGY PROGRESS NOTE      4/13/2017 8:05 AM    Admit Date: 4/10/2017    Admit Diagnosis: Atrial fibrillation with RVR (HCC)      Subjective:   No chest pain or dyspnea.         Objective:      Vitals:    04/12/17 1548 04/12/17 2102 04/13/17 0055 04/13/17 0510   BP: 124/72 133/80 122/69 129/75   Pulse: 74 93 92 95   Resp: 19 18 18 18   Temp: 98.6 °F (37 °C) 99.7 °F (37.6 °C) 98.8 °F (37.1 °C) 98.3 °F (36.8 °C)   SpO2: 98% 95% 97% 95%   Weight:    90.9 kg (200 lb 4.8 oz)   Height:           Physical Exam:  Neck-   CV-i rr+r  Lungs- ronchi  Ext-  Skin- warm and dry        Data Review:   Recent Labs      04/13/17   0431  04/12/17   0608  04/11/17   0350   NA  142  142  143   K  3.9  3.9  3.3*   MG  2.1  2.0  2.1   BUN  14  16  15   CREA  0.92  0.88  0.97   GLU  108*  114*  103*   WBC  7.5  7.7  6.2   HGB  10.8*  11.4*  11.3*   HCT  33.6*  34.0*  35.3*   PLT  188  192  170   CHOL   --    --   140   HDL   --    --   51       Assessment and Plan:     Principal Problem:    Atrial fibrillation with RVR (HCC) (5/26/2009)     rate controlled tolerating sotalol  SHWETHA CV discussed and agreeable to proceed this am   Active Problems:    HTN (hypertension) (5/26/2009)      Mixed hyperlipidemia ()      GERD (gastroesophageal reflux disease) (6/30/2015)      Hyperthyroidism (4/11/2017)       elsi    will repeat CXR     Almita Miranda MD

## 2017-04-14 VITALS
TEMPERATURE: 98 F | OXYGEN SATURATION: 95 % | BODY MASS INDEX: 28.11 KG/M2 | HEIGHT: 71 IN | HEART RATE: 70 BPM | WEIGHT: 200.8 LBS | SYSTOLIC BLOOD PRESSURE: 149 MMHG | DIASTOLIC BLOOD PRESSURE: 76 MMHG | RESPIRATION RATE: 18 BRPM

## 2017-04-14 PROBLEM — C71.9 MALIGNANT NEOPLASM OF BRAIN (HCC): Status: ACTIVE | Noted: 2017-04-14

## 2017-04-14 LAB
ANION GAP BLD CALC-SCNC: 7 MMOL/L (ref 7–16)
BUN SERPL-MCNC: 15 MG/DL (ref 8–23)
CALCIUM SERPL-MCNC: 8.5 MG/DL (ref 8.3–10.4)
CHLORIDE SERPL-SCNC: 109 MMOL/L (ref 98–107)
CO2 SERPL-SCNC: 26 MMOL/L (ref 21–32)
CREAT SERPL-MCNC: 0.92 MG/DL (ref 0.8–1.5)
ERYTHROCYTE [DISTWIDTH] IN BLOOD BY AUTOMATED COUNT: 14 % (ref 11.9–14.6)
GLUCOSE SERPL-MCNC: 96 MG/DL (ref 65–100)
HCT VFR BLD AUTO: 32.6 % (ref 41.1–50.3)
HGB BLD-MCNC: 10.4 G/DL (ref 13.6–17.2)
MAGNESIUM SERPL-MCNC: 2 MG/DL (ref 1.8–2.4)
MCH RBC QN AUTO: 26.5 PG (ref 26.1–32.9)
MCHC RBC AUTO-ENTMCNC: 31.9 G/DL (ref 31.4–35)
MCV RBC AUTO: 83.2 FL (ref 79.6–97.8)
PLATELET # BLD AUTO: 197 K/UL (ref 150–450)
PMV BLD AUTO: 11.9 FL (ref 10.8–14.1)
POTASSIUM SERPL-SCNC: 3.8 MMOL/L (ref 3.5–5.1)
RBC # BLD AUTO: 3.92 M/UL (ref 4.23–5.67)
SODIUM SERPL-SCNC: 142 MMOL/L (ref 136–145)
WBC # BLD AUTO: 7.2 K/UL (ref 4.3–11.1)

## 2017-04-14 PROCEDURE — 36415 COLL VENOUS BLD VENIPUNCTURE: CPT | Performed by: NURSE PRACTITIONER

## 2017-04-14 PROCEDURE — 83735 ASSAY OF MAGNESIUM: CPT | Performed by: NURSE PRACTITIONER

## 2017-04-14 PROCEDURE — 85027 COMPLETE CBC AUTOMATED: CPT | Performed by: NURSE PRACTITIONER

## 2017-04-14 PROCEDURE — 74011250637 HC RX REV CODE- 250/637: Performed by: NURSE PRACTITIONER

## 2017-04-14 PROCEDURE — 74011250637 HC RX REV CODE- 250/637: Performed by: INTERNAL MEDICINE

## 2017-04-14 PROCEDURE — 80048 BASIC METABOLIC PNL TOTAL CA: CPT | Performed by: NURSE PRACTITIONER

## 2017-04-14 PROCEDURE — 74011250636 HC RX REV CODE- 250/636: Performed by: INTERNAL MEDICINE

## 2017-04-14 RX ORDER — SOTALOL HYDROCHLORIDE 160 MG/1
160 TABLET ORAL EVERY 12 HOURS
Qty: 60 TAB | Refills: 11 | Status: SHIPPED | OUTPATIENT
Start: 2017-04-14 | End: 2018-04-16 | Stop reason: SDUPTHER

## 2017-04-14 RX ADMIN — SOTALOL HYDROCHLORIDE 160 MG: 80 TABLET ORAL at 08:36

## 2017-04-14 RX ADMIN — Medication 10 ML: at 05:22

## 2017-04-14 RX ADMIN — LORATADINE 10 MG: 10 TABLET ORAL at 08:36

## 2017-04-14 RX ADMIN — SODIUM CHLORIDE 50 ML/HR: 900 INJECTION, SOLUTION INTRAVENOUS at 05:26

## 2017-04-14 RX ADMIN — GUAIFENESIN 600 MG: 600 TABLET, EXTENDED RELEASE ORAL at 08:36

## 2017-04-14 RX ADMIN — METHIMAZOLE 10 MG: 10 TABLET ORAL at 08:36

## 2017-04-14 RX ADMIN — PANTOPRAZOLE SODIUM 40 MG: 40 TABLET, DELAYED RELEASE ORAL at 08:37

## 2017-04-14 NOTE — PROGRESS NOTES
Discharge instructions reviewed with patient. Prescriptions given for sotalol and med info sheets provided for all new medications. Opportunity for questions provided. Parvin voiced understanding of all discharge instructions. IVs removed and monitor off at discharge.

## 2017-04-14 NOTE — PROGRESS NOTES
Verbal bedside report given to Myriam Tang oncoming RN. Patient's situation, background, assessment and recommendations provided. Opportunity for questions provided.

## 2017-04-14 NOTE — PROGRESS NOTES
Bedside and Verbal shift change report given to Self and Scarlett Schmid RN (oncoming nurse) by Sukh Fontaine RN (offgoing nurse). Report included the following information SBAR, Kardex and Recent Results.

## 2017-04-14 NOTE — DISCHARGE SUMMARY
[facility] Cardiology Discharge Summary     Patient ID:  Lana Bernard  [id number]  68 y.o.  1940    Admit date: 4/10/2017    Discharge date:  4/14/17    Admitting Physician: Paradise Nicole MD     Discharge Physician: Terry Padilla NP/Dr. Taina Smith    Admission Diagnoses: Atrial fibrillation with RVR [facility])    Discharge Diagnoses:   Patient Active Problem List    Diagnosis Date Noted    Hyperthyroidism 04/11/2017    GERD (gastroesophageal reflux disease) 06/30/2015    Hearing impaired 06/30/2015    Abdominal pain, chronic, right lower quadrant 03/24/2015    Rhinitis due to pollen 03/24/2015    Mixed hyperlipidemia     Long term current use of anticoagulant therapy     Intracranial meningioma ([facility] [address] 07/23/2014    SIRS (systemic inflammatory response syndrome) ([facility] [address] 07/22/2014    Atrial flutter ([facility] [address] 07/21/2014    H/O prostate cancer 07/09/2014    Left ventricular hypertrophy 05/28/2009    Mitral valve regurgitation 05/28/2009    Anticoagulation monitoring, INR range 2-3 05/28/2009    Nodular goiter, toxic or with hyperthyroidism 05/28/2009    Atrial fibrillation with RVR ([facility] [address] 05/26/2009    HTN (hypertension) 05/26/2009       Cardiology Procedures this admission:  SHWETHA/Cardioversion  Consults: None    Hospital Course: [place] [facility] [place] of fatigue, cough and tachycardia. The patient was noted to be in atrial fibrillation with RVR on arrival.  A Cardizem bolus was given in the ER with good response. The patient was admitted to telemetry and Cardizem drip was continued and sotalol loading started. The patient was continued on xarelto. SHWETHA/Cardioversion was planned. The patient underwent SHWETHA that was negative for thrombus and then underwent successful cardioversion from atrial fibrillation to sinus rhythm on 4/14/17. Pt tolerated the procedure well and was taken to the telemetry floor for recovery.   The morning of 4/14/17, the patient was feeling much better and remained in sinus rhythm. Pt was monitored on telemetry for >6 doses of sotalol with EKGs showing acceptable Qtc intervals. Pt was up feeling well without any complaints of CP, SOB or palpitations. Pt's labs were WNL. Pt was seen and examined by Dr. Letitia Sanches and determined stable and ready for discharge. Pt was instructed on the importance of taking Sotalol and Xarelto without missing a dose. Pt will follow up in the office in the next 2 weeks with Dr. Amy Meza on 5/2/17 at 8:30 am in [facility]. Dr. Amy Meza will follow MR outpatient. DISPOSITION: The patient is being discharged home in stable condition on a low saturated fat, low cholesterol and low salt diet. The patient is instructed to advance activities as tolerated to the limit of fatigue or shortness of breath. The patient is instructed to call the office or return to the ER for immediate evaluation for any severe shortness of breath, chest pain, prolonged palpitations, near syncope or syncope. Discharge Exam:   Visit Vitals    /74 (BP 1 Location: Left arm, BP Patient Position: Sitting)    Pulse 69    Temp 99.2 °F (37.3 °C)    Resp 18    Ht 5' 11\" (1.803 m)    Wt 91.1 kg (200 lb 12.8 oz)    SpO2 99%    BMI 28.01 kg/m2     Patient has been seen by Dr. Letitia Sanches: see his progress note for exam details. Recent Results (from the past 24 hour(s))   EKG, 12 LEAD, INITIAL    Collection Time: 04/13/17 12:15 PM   Result Value Ref Range    Ventricular Rate 59 BPM    Atrial Rate 59 BPM    P-R Interval 198 ms    QRS Duration 98 ms    Q-T Interval 450 ms    QTC Calculation (Bezet) 445 ms    Calculated P Axis 56 degrees    Calculated R Axis 18 degrees    Calculated T Axis 46 degrees    Diagnosis       Sinus bradycardia  Possible Left atrial enlargement  Borderline ECG  When compared with ECG of 12-APR-2017 22:59,  Sinus rhythm has replaced Atrial fibrillation  Vent.  rate has decreased BY  42 BPM  Nonspecific T wave abnormality no longer evident in Lateral leads  Confirmed by ANGELINA DUFF (), [facility] ([id number]) on 4/13/2017 10:27:20 PM     CBC W/O DIFF    Collection Time: 04/14/17  6:00 AM   Result Value Ref Range    WBC 7.2 4.3 - 11.1 K/uL    RBC 3.92 (L) 4.23 - 5.67 M/uL    HGB 10.4 (L) 13.6 - 17.2 g/dL    HCT 32.6 (L) 41.1 - 50.3 %    MCV 83.2 79.6 - 97.8 FL    MCH 26.5 26.1 - 32.9 PG    MCHC 31.9 31.4 - 35.0 g/dL    RDW 14.0 11.9 - 14.6 %    PLATELET 027 292 - 159 K/uL    MPV 11.9 10.8 - 14.1 FL         Patient Instructions:   Current Discharge Medication List      START taking these medications    Details   sotalol (BETAPACE) 160 mg tablet Take 1 Tab by mouth every twelve (12) hours. Qty: 60 Tab, Refills: 11         CONTINUE these medications which have NOT CHANGED    Details   L. gasseri-B. bifidum-B longum 1.5 billion cell cap Take  by mouth as needed. rivaroxaban (XARELTO) 20 mg tab tablet Take 1 Tab by mouth daily (with dinner). Indications: PREVENT THROMBOEMBOLISM IN CHRONIC ATRIAL FIBRILLATION  Qty: 90 Tab, Refills: 3    Associated Diagnoses: Atrial fibrillation, unspecified type ([address] Long term current use of anticoagulant therapy      methimazole (TAPAZOLE) 10 mg tablet Take 5 mg by mouth daily. STOP taking these medications       diltiazem CD (CARDIZEM CD) 120 mg ER capsule Comments:   Reason for Stopping:         metoprolol tartrate (LOPRESSOR) 50 mg tablet Comments:   Reason for Stopping:             Signed:  Adela Ortiz NP  4/14/2017  7:22 AM    Patient seen and examined. Agree with discharge on above medications. F/U in office.      Feliciano De La Rosa MD

## 2017-04-14 NOTE — DISCHARGE INSTRUCTIONS
Atrial Fibrillation: Care Instructions  Your Care Instructions    Atrial fibrillation is an irregular and often fast heartbeat. Treating this condition is important for several reasons. It can cause blood clots, which can travel from your heart to your brain and cause a stroke. If you have a fast heartbeat, you may feel lightheaded, dizzy, and weak. An irregular heartbeat can also increase your risk for heart failure. Atrial fibrillation is often the result of another heart condition, such as high blood pressure or coronary artery disease. Making changes to improve your heart condition will help you stay healthy and active. Follow-up care is a key part of your treatment and safety. Be sure to make and go to all appointments, and call your doctor if you are having problems. It's also a good idea to know your test results and keep a list of the medicines you take. How can you care for yourself at home? Medicines  · Take your medicines exactly as prescribed. Call your doctor if you think you are having a problem with your medicine. You will get more details on the specific medicines your doctor prescribes. · If your doctor has given you a blood thinner to prevent a stroke, be sure you get instructions about how to take your medicine safely. Blood thinners can cause serious bleeding problems. · Do not take any vitamins, over-the-counter drugs, or herbal products without talking to your doctor first.  Lifestyle changes  · Do not smoke. Smoking can increase your chance of a stroke and heart attack. If you need help quitting, talk to your doctor about stop-smoking programs and medicines. These can increase your chances of quitting for good. · Eat a heart-healthy diet. · Stay at a healthy weight. Lose weight if you need to. · Limit alcohol to 2 drinks a day for men and 1 drink a day for women. Too much alcohol can cause health problems. · Avoid colds and flu. Get a pneumococcal vaccine shot.  If you have had one before, ask your doctor whether you need another dose. Get a flu shot every year. If you must be around people with colds or flu, wash your hands often. Activity  · If your doctor recommends it, get more exercise. Walking is a good choice. Bit by bit, increase the amount you walk every day. Try for at least 30 minutes on most days of the week. You also may want to swim, bike, or do other activities. Your doctor may suggest that you join a cardiac rehabilitation program so that you can have help increasing your physical activity safely. · Start light exercise if your doctor says it is okay. Even a small amount will help you get stronger, have more energy, and manage stress. Walking is an easy way to get exercise. Start out by walking a little more than you did in the hospital. Gradually increase the amount you walk. · When you exercise, watch for signs that your heart is working too hard. You are pushing too hard if you cannot talk while you are exercising. If you become short of breath or dizzy or have chest pain, sit down and rest immediately. · Check your pulse regularly. Place two fingers on the artery at the palm side of your wrist, in line with your thumb. If your heartbeat seems uneven or fast, talk to your doctor. When should you call for help? Call 911 anytime you think you may need emergency care. For example, call if:  · You have symptoms of a heart attack. These may include:  ¨ Chest pain or pressure, or a strange feeling in the chest.  ¨ Sweating. ¨ Shortness of breath. ¨ Nausea or vomiting. ¨ Pain, pressure, or a strange feeling in the back, neck, jaw, or upper belly or in one or both shoulders or arms. ¨ Lightheadedness or sudden weakness. ¨ A fast or irregular heartbeat. After you call 911, the  may tell you to chew 1 adult-strength or 2 to 4 low-dose aspirin. Wait for an ambulance. Do not try to drive yourself. · You have symptoms of a stroke.  These may include:  ¨ Sudden numbness, tingling, weakness, or loss of movement in your face, arm, or leg, especially on only one side of your body. ¨ Sudden vision changes. ¨ Sudden trouble speaking. ¨ Sudden confusion or trouble understanding simple statements. ¨ Sudden problems with walking or balance. ¨ A sudden, severe headache that is different from past headaches. · You passed out (lost consciousness). Call your doctor now or seek immediate medical care if:  · You have new or increased shortness of breath. · You feel dizzy or lightheaded, or you feel like you may faint. · Your heart rate becomes irregular. · You can feel your heart flutter in your chest or skip heartbeats. Tell your doctor if these symptoms are new or worse. Watch closely for changes in your health, and be sure to contact your doctor if you have any problems. Where can you learn more? Go to http://harley-abilio.info/. Enter U020 in the search box to learn more about \"Atrial Fibrillation: Care Instructions. \"  Current as of: January 27, 2016  Content Version: 11.2  © 8920-5167 [employer]. Care instructions adapted under license by [employer] (which disclaims liability or warranty for this information). If you have questions about a medical condition or this instruction, always ask your healthcare professional. [address] any warranty or liability for your use of this information. Sotalol (By mouth)   Sotalol (ARNOLD-ta-lol)  Treats heart rhythm problems. This medicine is a beta blocker. Brand Name(s):Betapace, Betapace AF, Sorine, Sotylize   There may be other brand names for this medicine. When This Medicine Should Not Be Used: This medicine is not right for everyone. Do not use it if you had an allergic reaction to sotalol, or you have certain heart or lung problems. Talk with your doctor about what these problems are.   How to Use This Medicine:   Liquid, Tablet  · Take your medicine as directed. Your dose may need to be changed several times to find what works best for you. · Measure the oral liquid medicine with a marked measuring spoon, oral syringe, or medicine cup. · Contact your doctor or pharmacist before your supply of this medicine starts to run low. Do not allow yourself to run out of medicine. · Read and follow the patient instructions that come with this medicine. Talk to your doctor or pharmacist if you have any questions. · Missed dose: Take a dose as soon as you remember. If it is almost time for your next dose, wait until then and take a regular dose. Do not take extra medicine to make up for a missed dose. · Store the medicine in a closed container at room temperature, away from heat, moisture, and direct light. Drugs and Foods to Avoid:   Ask your doctor or pharmacist before using any other medicine, including over-the-counter medicines, vitamins, and herbal products. · Some foods and medicines can affect how sotalol works. Tell your doctor if you are using the following:  ¨ Albuterol, clonidine, digoxin, guanethidine, isoproterenol, reserpine, terbutaline  ¨ Blood pressure medicines  ¨ Insulin or diabetes medicine  ¨ Medicine to treat depression  ¨ Medicine to treat an infection  ¨ Other medicine to treat heart rhythm problems, such as amiodarone, disopyramide, procainamide, or quinidine  ¨ Phenothiazine medicine (such as chlorpromazine, perphenazine, prochlorperazine, promethazine, thioridazine)  · If you are taking an antacid that contains aluminum or magnesium hydroxide, take it 2 hours before or 2 hours after you take sotalol. Warnings While Using This Medicine:   · Tell your doctor if you are pregnant or breastfeeding, or if you have kidney disease, diabetes, heart disease, angina, low blood pressure, lung or breathing problems, overactive thyroid, or a history of severe allergic reactions or heart attack.   · This medicine may cause increased heart rhythm problems while your dose is being adjusted. · Do not stop using this medicine suddenly. Your doctor will need to slowly decrease your dose before you stop it completely. You could have worsening chest pain or heart rhythm problems if you stop using this medicine suddenly. · This medicine may raise or lower your blood sugar level. · This medicine may make you dizzy. Do not drive or do anything else that could be dangerous until you know how this medicine affects you. Stand up slowly if you feel dizzy or lightheaded. · Tell any doctor or dentist who treats you that you are using this medicine. · Your doctor will do lab tests at regular visits to check on the effects of this medicine. Keep all appointments. ECG tests will be needed to check for unwanted effects. · Keep all medicine out of the reach of children. Never share your medicine with anyone. Possible Side Effects While Using This Medicine:   Call your doctor right away if you notice any of these side effects:  · Allergic reaction: Itching or hives, swelling in your face or hands, swelling or tingling in your mouth or throat, chest tightness, trouble breathing  · Chest pain  · Dry mouth, increased thirst, muscle cramps, nausea or vomiting  · Fainting, dizziness, lightheadedness  · Fast, slow, or irregular heartbeat  · Rapid weight gain, swelling in your hands, feet, or ankles, trouble breathing  If you notice these less serious side effects, talk with your doctor:   · Diarrhea  · Increased sweating  · Tiredness or weakness  If you notice other side effects that you think are caused by this medicine, tell your doctor. Call your doctor for medical advice about side effects. You may report side effects to FDA at 0-540-FDA-3789  © 2016 [address] is for End User's use only and may not be sold, redistributed or otherwise used for commercial purposes. The above information is an  only.  It is not intended as medical advice for individual conditions or treatments. Talk to your doctor, nurse or pharmacist before following any medical regimen to see if it is safe and effective for you.

## 2017-04-14 NOTE — PROGRESS NOTES
Problem: Falls - Risk of  Goal: *Absence of falls  Outcome: Progressing Towards Goal  Gripper socks on; bed low and locked; call light in reach.  Pt encouraged to call for assistance

## 2017-04-15 ENCOUNTER — PATIENT OUTREACH (OUTPATIENT)
Dept: CASE MANAGEMENT | Age: 77
End: 2017-04-15

## 2017-04-15 NOTE — PROGRESS NOTES
Initial outreach attempt to patient was unsuccessful. Second outreach attempt will be made within 24 hours. This note will not be viewable in [address]

## 2017-04-16 ENCOUNTER — PATIENT OUTREACH (OUTPATIENT)
Dept: CASE MANAGEMENT | Age: 77
End: 2017-04-16

## 2017-04-16 NOTE — PROGRESS NOTES
Transition of Care Discharge Follow-up Questionnaire   Date/Time of Call:   4/16/17 2:03p   What was the patient hospitalized for? Atrial fibrillation with RVR   Does the patient understand his/her diagnosis and/or treatment and what happened during the hospitalization? Patients spouse understands his diagnosis and treatment during hospitalization. Did the patient receive discharge instructions? Yes   Review any discharge instructions (see notes in [facility]). Ask patient if they understand these. Do they have any questions? He does not have any questions in regards to discharge instructions. Were home services ordered (nursing, PT, OT, ST, etc.)? No   If so, has the first visit occurred? If not, why? (Assist with coordination of services if necessary.) n/a   Was any DME ordered? No   If so, has it been received? If not, why?  (Assist with coordination of arranging DME orders if necessary.) n/a   Complete a review of all medications (new, continued and discontinued meds per the D/C instructions and medication tab in [facility]). Patients spouse and care coordinator reviewed current medications. Patient is compliant with active medications. Were all new prescriptions filled? If not, why?  (Assist with obtainment of medications if necessary.) Yes   Does the patient understand the purpose and dosing instructions for all medications? (If patient has questions, provide explanation and education.) Yes   Does the patient have any problems in performing ADLs? (If patient is unable to perform ADLs  what is the limiting factor(s)? Do they have a support system that can assist? If no support system is present, discuss possible assistance that they may be able to obtain.) Patient is independent with ADLs. Patient has resumed daily activities such as walking in the neighborhood and riding his bike. Does the patient have all follow-up appointments scheduled?   Has transportation been arranged? [facility] Pulmonary follow-up should be within 7 days of discharge; all others should have PCP follow-up within 7 days of discharge; follow-ups with other specialists as appropriate or ordered.) Patient has a f/u appointment 4/19/17. Any other questions or concerns expressed by the patient? Spouse has concerns about patient. She indicates patient had radiating jaw pain this morning. Spouse verbalizes pain has subsided. Spouse states patient has diarrhea since discharge. He is able to keep foods down. Care coordinator advised patient and his spouse to contact PCP if symptoms worsen and he is not able to hold foods down or jaw pain persists. She was in agreement with instructions. She was thankful for the call and did not have any other concerns. LATRELL Call Completed By: Suhas Moy LPN  [facility] [address] Coordinator          This note will not be viewable in [address]

## 2017-05-02 PROBLEM — I48.0 PAROXYSMAL ATRIAL FIBRILLATION (HCC): Status: ACTIVE | Noted: 2017-05-02

## 2017-05-02 PROBLEM — I34.1 MITRAL VALVE PROLAPSE: Status: ACTIVE | Noted: 2017-05-02

## 2017-05-12 ENCOUNTER — PATIENT OUTREACH (OUTPATIENT)
Dept: CASE MANAGEMENT | Age: 77
End: 2017-05-12

## 2017-05-12 NOTE — PROGRESS NOTES
Care coordinator spoke to patient's spouse. Patient is \"doing well. \" He has attended his f/u appointment with \"his primary and thryoid doctor. \" He is compliant with medications. Patient received a new prescription for his thyroid medication. Patient \"was breaking a 10mg pill in half\" and \"physician wrote a new prescription for 5mg. \" She has filled his medication at the pharmacy. There are no current needs or issues to be addressed. She was thankful for call. This note will not be viewable in [address]

## 2017-08-17 PROBLEM — I48.0 PAROXYSMAL ATRIAL FIBRILLATION (HCC): Chronic | Status: ACTIVE | Noted: 2017-05-02

## 2017-09-08 PROBLEM — N62 GYNECOMASTIA, MALE: Status: ACTIVE | Noted: 2017-09-08

## 2017-10-28 PROBLEM — Z79.899 HIGH RISK MEDICATION USE: Status: ACTIVE | Noted: 2017-10-28

## 2018-05-17 NOTE — PROGRESS NOTES
Patient pre-assessment complete for Cardioversion with DR TOSIN MUIR JR. [facility] scheduled for 18 at 8am, arrival time 7am. Patient verified using . Patient instructed to bring all home medications in labeled bottles on the day of procedure. NPO status reinforced. Patient instructed to HOLD HCTZ. Instructed they can take all other medications excluding vitamins & supplements. Patient verbalizes understanding of all instructions & denies any questions at this time.

## 2018-05-18 ENCOUNTER — HOSPITAL ENCOUNTER (OUTPATIENT)
Dept: CARDIAC CATH/INVASIVE PROCEDURES | Age: 78
Discharge: HOME | End: 2018-05-18
Attending: INTERNAL MEDICINE | Admitting: INTERNAL MEDICINE
Payer: MEDICARE

## 2018-05-18 VITALS
DIASTOLIC BLOOD PRESSURE: 70 MMHG | BODY MASS INDEX: 28.56 KG/M2 | WEIGHT: 204 LBS | HEART RATE: 51 BPM | OXYGEN SATURATION: 100 % | RESPIRATION RATE: 33 BRPM | HEIGHT: 71 IN | SYSTOLIC BLOOD PRESSURE: 118 MMHG | TEMPERATURE: 98.4 F

## 2018-05-18 LAB
ANION GAP SERPL CALC-SCNC: 6 MMOL/L (ref 7–16)
ATRIAL RATE: 267 BPM
ATRIAL RATE: 50 BPM
BUN SERPL-MCNC: 16 MG/DL (ref 8–23)
CALCIUM SERPL-MCNC: 9.3 MG/DL (ref 8.3–10.4)
CALCULATED P AXIS, ECG09: 52 DEGREES
CALCULATED R AXIS, ECG10: -6 DEGREES
CALCULATED R AXIS, ECG10: 44 DEGREES
CALCULATED T AXIS, ECG11: 32 DEGREES
CALCULATED T AXIS, ECG11: 41 DEGREES
CHLORIDE SERPL-SCNC: 108 MMOL/L (ref 98–107)
CO2 SERPL-SCNC: 28 MMOL/L (ref 21–32)
CREAT SERPL-MCNC: 1.17 MG/DL (ref 0.8–1.5)
DIAGNOSIS, 93000: NORMAL
DIAGNOSIS, 93000: NORMAL
ERYTHROCYTE [DISTWIDTH] IN BLOOD BY AUTOMATED COUNT: 14.9 % (ref 11.9–14.6)
GLUCOSE SERPL-MCNC: 92 MG/DL (ref 65–100)
HCT VFR BLD AUTO: 36.7 % (ref 41.1–50.3)
HGB BLD-MCNC: 11.9 G/DL (ref 13.6–17.2)
INR PPP: 2
MAGNESIUM SERPL-MCNC: 2.3 MG/DL (ref 1.8–2.4)
MCH RBC QN AUTO: 27.6 PG (ref 26.1–32.9)
MCHC RBC AUTO-ENTMCNC: 32.4 G/DL (ref 31.4–35)
MCV RBC AUTO: 85.2 FL (ref 79.6–97.8)
P-R INTERVAL, ECG05: 242 MS
PLATELET # BLD AUTO: 258 K/UL (ref 150–450)
PMV BLD AUTO: 12.6 FL (ref 10.8–14.1)
POTASSIUM SERPL-SCNC: 4.8 MMOL/L (ref 3.5–5.1)
PROTHROMBIN TIME: 22.2 SEC (ref 11.5–14.5)
Q-T INTERVAL, ECG07: 430 MS
Q-T INTERVAL, ECG07: 528 MS
QRS DURATION, ECG06: 108 MS
QRS DURATION, ECG06: 108 MS
QTC CALCULATION (BEZET), ECG08: 481 MS
QTC CALCULATION (BEZET), ECG08: 499 MS
RBC # BLD AUTO: 4.31 M/UL (ref 4.23–5.67)
SODIUM SERPL-SCNC: 142 MMOL/L (ref 136–145)
VENTRICULAR RATE, ECG03: 50 BPM
VENTRICULAR RATE, ECG03: 81 BPM
WBC # BLD AUTO: 3.9 K/UL (ref 4.3–11.1)

## 2018-05-18 PROCEDURE — 93005 ELECTROCARDIOGRAM TRACING: CPT | Performed by: INTERNAL MEDICINE

## 2018-05-18 PROCEDURE — 99152 MOD SED SAME PHYS/QHP 5/>YRS: CPT

## 2018-05-18 PROCEDURE — 74011250636 HC RX REV CODE- 250/636

## 2018-05-18 PROCEDURE — 92960 CARDIOVERSION ELECTRIC EXT: CPT

## 2018-05-18 PROCEDURE — 85610 PROTHROMBIN TIME: CPT | Performed by: INTERNAL MEDICINE

## 2018-05-18 PROCEDURE — 85027 COMPLETE CBC AUTOMATED: CPT | Performed by: INTERNAL MEDICINE

## 2018-05-18 PROCEDURE — 80048 BASIC METABOLIC PNL TOTAL CA: CPT | Performed by: INTERNAL MEDICINE

## 2018-05-18 PROCEDURE — 83735 ASSAY OF MAGNESIUM: CPT | Performed by: INTERNAL MEDICINE

## 2018-05-18 RX ORDER — SODIUM CHLORIDE 9 MG/ML
75 INJECTION, SOLUTION INTRAVENOUS CONTINUOUS
Status: DISCONTINUED | OUTPATIENT
Start: 2018-05-18 | End: 2018-05-18 | Stop reason: HOSPADM

## 2018-05-18 RX ORDER — MIDAZOLAM HYDROCHLORIDE 1 MG/ML
.5-5 INJECTION, SOLUTION INTRAMUSCULAR; INTRAVENOUS
Status: DISCONTINUED | OUTPATIENT
Start: 2018-05-18 | End: 2018-05-18 | Stop reason: HOSPADM

## 2018-05-18 RX ADMIN — MIDAZOLAM HYDROCHLORIDE 4 MG: 1 INJECTION, SOLUTION INTRAMUSCULAR; INTRAVENOUS at 08:12

## 2018-05-18 NOTE — PROGRESS NOTES
TRANSFER - OUT REPORT:    Verbal report given to [facility] RN(name) on [employer].  being transferred to cpru(unit) for routine progression of care       Report consisted of patients Situation, Background, Assessment and   Recommendations(SBAR). Information from the following report(s) Procedure Summary was reviewed with the receiving nurse. Lines:   Peripheral IV 05/18/18 Left Antecubital (Active)        Opportunity for questions and clarification was provided.       Patient transported with:   Registered Nurse     CVN completed by Dr Ashli Hadley   Cardioverted at 200 j x1  4 mg versed  Pt is SB

## 2018-05-18 NOTE — PROCEDURES
Brief Cardiac Procedure Note    Patient: Baljeet Quispe MRN: [id number]  SSN: [id number]    YOB: 1940  Age: 68 y.o. Sex: male      Date of Procedure: 5/18/2018     Pre-procedure Diagnosis: Atrial Fibrillation/Atrial Flutter    Post-procedure Diagnosis: same    Reason for Procedure: Cardiac Arrhythmia    Procedure: Cardioversion    Brief Description of Procedure:     Performed By: Ascencion Lizama MD     Assistants:     Anesthesia: Moderate Sedation    Estimated Blood Loss: Less than 10 mL      Specimens: None    Implants: None    Findings: nsr after 474 joules    Complications: None    Recommendations: Continue medical therapy.     Signed By: Ascencion Lizama MD     May 18, 2018

## 2018-05-18 NOTE — PROCEDURES
[address], [facility]  MR[facility] [id number]  : 1940  ACCOUNT #: [de-identified]   DATE OF SERVICE: 2018    PROCEDURE PERFORMED:  Electrical cardioversion. HISTORY:  This is a 54-year-old gentleman with persistent atrial fibrillation. He is on chronic oral anticoagulation. An electrical cardioversion was recommended. DESCRIPTION OF PROCEDURE:  After adequate sedation with Versed, the patient was cardioverted from atrial fibrillation to normal sinus rhythm with 200 joules. He tolerated it well.       MD MACARENA Fischer /   D: 2018 09:14     T: 2018 10:02  JOB #: [id number]

## 2018-05-18 NOTE — PROGRESS NOTES
Discharge instructions given per orders, voiced good understanding of post cardioversion care, medications & follow up care. Denies any questions.  Discharged to home via wheelchair

## 2018-05-18 NOTE — PROGRESS NOTES
Patient received to [address] [place] [address] room # 7  Ambulatory from [facility]. Patient scheduled for cardioversion today with Dr Ar Hannon. Procedure reviewed & questions answered, voiced good understanding consent obtained & placed on chart. All medications and medical history reviewed. Will prep patient per orders. Patient & family updated on plan of care.       The patient has a fraility score of 3-MANAGING WELL, based on ability to perform ADLs with shortness of breath

## 2018-05-18 NOTE — IP AVS SNAPSHOT
[address] [facility], [place] 
 
 
 [address] 
[phone] Patient: Wayne Melton. MRN: [id number] FLI:[id number] Discharge Summary 5/18/2018 Wayne Melton. MRN[de-identified]  [id number] Admission Information Provider Pager Service Admission Date Expected D/C Date Denny Roberson [address] [facility] CATH LAB 5/18/2018 Actual LOS Patient Class 0 days OUTPATIENT Follow-up Information Follow up With Details Comments Contact Info Denny Roberson MD On 5/31/2018 at 4:15 in the [place] office for a cardioversion followup [address] [facility] [address] 
[phone] My Medications ASK your physician about these medications Instructions Each Dose to Equal  
 Morning Noon Evening Bedtime  
 amLODIPine 5 mg tablet Commonly known as:  Erenest Jodi Your last dose was: Your next dose is: Take 1 Tab by mouth daily. 5 mg  
    
   
   
   
  
 hydroCHLOROthiazide 25 mg tablet Commonly known as:  HYDRODIURIL Your last dose was: Your next dose is: Take 1 Tab by mouth daily. 25 mg  
    
   
   
   
  
 methIMAzole 10 mg tablet Commonly known as:  TAPAZOLE Your last dose was: Your next dose is: Take 1 Tab by mouth daily. Indications: hyperthyroidism 10 mg  
    
   
   
   
  
 pentoxifylline  mg CR tablet Commonly known as:  TRENTAL Your last dose was: Your next dose is: TAKE 1 TABLET (400 MG) BY MOUTH 2 (TWO) TIMES A DAY WITH MEALS  
     
   
   
   
  
 rivaroxaban 20 mg Tab tablet Commonly known as:  Elkin Nette Your last dose was: Your next dose is: Take 1 Tab by mouth daily (with dinner). Indications: PREVENT THROMBOEMBOLISM IN CHRONIC ATRIAL FIBRILLATION  
 20 mg  
    
   
   
   
  
 sotalol 160 mg tablet Commonly known as:  Druscvargas Erm  
   
 Your last dose was: Your next dose is: Take 1 Tab by mouth every twelve (12) hours. 160 mg  
    
   
   
   
  
 valsartan 320 mg tablet Commonly known as:  DIOVAN Your last dose was: Your next dose is: Take 1 Tab by mouth daily. Indications: hypertension 320 mg  
    
   
   
   
  
  
  
  
 
  
General Information Please provide this summary of care documentation to your next provider. Allergies Unspecified:  Codeine;  Doxazosin Mesylate; Lotrel [Amlodipine-benazepril];  Pcn [Penicillins] Current Immunizations  Reviewed on 7/23/2014 Name Date Influenza Vaccine 11/1/2016, 11/1/2015, 10/29/2015 Influenza Vaccine (Quad) Mdck Pf 9/21/2017 Pneumococcal Vaccine (Unspecified Type) 11/7/2014 TB Skin Test (PPD) Intradermal 7/14/2014 ZZZ-RETIRED (DO NOT USE) Pneumococcal Vaccine (Unspecified Type) 5/28/2009 Discharge Instructions Discharge Instructions Electrical Cardioversion: Care Instructions Your Care Instructions Electrical cardioversion is a treatment for an abnormal heartbeat. For example, it may be used to treat atrial fibrillation. In cardioversion, a brief electric shock is given to the heart to reset its rhythm. The shock comes through patches that are put on the outside of your chest. Cardioversion most often restores the heartbeat to normal. 
After the procedure, you may have redness where the patches were. (This may look like a sunburn.) Do not drive until the day after a cardioversion. You can eat and drink when you feel ready to. Your doctor may have you take medicines daily to help the heart beat in a normal way and to prevent blood clots. Your doctor may give you medicine before and after cardioversion. This is to help keep your heart in a normal rhythm after the procedure.  
Instead of electric cardioversion, your doctor may try to change your heartbeat to a normal rhythm by giving you medicine. This is most often done in a clinic or hospital. 
You may have had a sedative to help you relax. You may be unsteady after having sedation. It can take a few hours for the medicine's effects to wear off. Common side effects of sedation include nausea, vomiting, and feeling sleepy or tired. The doctor has checked you carefully, but problems can develop later. If you notice any problems or new symptoms, get medical treatment right away. Follow-up care is a key part of your treatment and safety. Be sure to make and go to all appointments, and call your doctor if you are having problems. It's also a good idea to know your test results and keep a list of the medicines you take. How can you care for yourself at home? Medicines ? · If the doctor gave you a sedative: ¨ For 24 hours, don't do anything that requires attention to detail. It takes time for the medicine's effects to completely wear off. ¨ For your safety, do not drive or operate any machinery that could be dangerous. Wait until the medicine wears off and you can think clearly and react easily. ? · Take your medicines exactly as prescribed. Call your doctor if you think you are having a problem with your medicine. You may take some of the following medicines: ¨ Antiarrhythmic medicines such as amiodarone (Cordarone). ¨ Beta-blockers such as propranolol (Inderal), metoprolol (Lopressor), or carvedilol (Coreg). ¨ Calcium channel blockers such as diltiazem (Cardizem) or verapamil (Calan). ¨ Digoxin (Lanoxin). You will get more details on the specific medicines your doctor prescribes. ? · If you take a blood thinner, be sure you get instructions about how to take your medicine safely. Blood thinners can cause serious bleeding problems. ? · Do not take any vitamins, over-the-counter medicines, or herbal products without talking to your doctor first. ? Lifestyle changes ? · Do not smoke. Smoking increases your risk of stroke and heart attack. If you need help quitting, talk to your doctor about stop-smoking programs and medicines. These can increase your chances of quitting for good. ? · Eat heart-healthy foods. ? · Limit alcohol to 2 drinks a day for men and 1 drink a day for women. Activity ? · If your doctor recommends it, get more exercise. Walking is a good choice. Bit by bit, increase the amount you walk every day. Try for 30 minutes on most days of the week. You also may want to swim, bike, or do other activities. ? · When you exercise, watch for signs that your heart is working too hard. You are pushing too hard if you cannot talk while you are exercising. If you become short of breath or dizzy or have chest pain, sit down and rest immediately. ? · Check your pulse regularly. Place two fingers on the artery at the palm side of your wrist in line with your thumb. If your heartbeat seems uneven or fast, talk to your doctor. When should you call for help? Call 911 anytime you think you may need emergency care. For example, call if: 
? · You have trouble breathing. ? · You passed out (lost consciousness). ? · You cough up pink, foamy mucus and you have trouble breathing. ? · You have symptoms of a heart attack. These may include: ¨ Chest pain or pressure, or a strange feeling in the chest. 
¨ Sweating. ¨ Shortness of breath. ¨ Nausea or vomiting. ¨ Pain, pressure, or a strange feeling in the back, neck, jaw, or upper belly or in one or both shoulders or arms. ¨ Lightheadedness or sudden weakness. ¨ A fast or irregular heartbeat. After you call 911, the  may tell you to chew 1 adult-strength or 2 to 4 low-dose aspirin. Wait for an ambulance. Do not try to drive yourself. ? · You have symptoms of a stroke.  These may include: 
¨ Sudden numbness, tingling, weakness, or loss of movement in your face, arm, or leg, especially on only one side of your body. ¨ Sudden vision changes. ¨ Sudden trouble speaking. ¨ Sudden confusion or trouble understanding simple statements. ¨ Sudden problems with walking or balance. ¨ A sudden, severe headache that is different from past headaches. ?Call your doctor now or seek immediate medical care if: 
? · You have new or worse nausea or vomiting. ? · You have new or increased shortness of breath. ? · You are dizzy or lightheaded, or you feel like you may faint. ? · You have sudden weight gain, such as more than 2 to 3 pounds in a day or 5 pounds in a week. ? · You have increased swelling in your legs, ankles, or feet. ? Watch closely for changes in your health, and be sure to contact your doctor if you have any problems. Where can you learn more? Go to http://harley-abilio.info/. Enter C532 in the search box to learn more about \"Electrical Cardioversion: Care Instructions. \" Current as of: September 21, 2016 Content Version: 11.4 © 1042-3750 [employer]. Care instructions adapted under license by [employer] (which disclaims liability or warranty for this information). If you have questions about a medical condition or this instruction, always ask your healthcare professional. [address] any warranty or liability for your use of this information. Discharge Orders None  
  
` Patient Signature:  ____________________________________________________________ Date:  ____________________________________________________________  
  
 Alexys Caldwell Provider Signature:  ____________________________________________________________ Date:  ____________________________________________________________

## 2018-05-18 NOTE — PROGRESS NOTES
Report received from [facility] for continue of care. Patient resting quietly with no complaints.  will continue to monitor until ready for discharge

## 2018-05-18 NOTE — DISCHARGE INSTRUCTIONS
Electrical Cardioversion: Care Instructions  Your Care Instructions    Electrical cardioversion is a treatment for an abnormal heartbeat. For example, it may be used to treat atrial fibrillation. In cardioversion, a brief electric shock is given to the heart to reset its rhythm. The shock comes through patches that are put on the outside of your chest. Cardioversion most often restores the heartbeat to normal.  After the procedure, you may have redness where the patches were. (This may look like a sunburn.) Do not drive until the day after a cardioversion. You can eat and drink when you feel ready to. Your doctor may have you take medicines daily to help the heart beat in a normal way and to prevent blood clots. Your doctor may give you medicine before and after cardioversion. This is to help keep your heart in a normal rhythm after the procedure. Instead of electric cardioversion, your doctor may try to change your heartbeat to a normal rhythm by giving you medicine. This is most often done in a clinic or hospital.  You may have had a sedative to help you relax. You may be unsteady after having sedation. It can take a few hours for the medicine's effects to wear off. Common side effects of sedation include nausea, vomiting, and feeling sleepy or tired. The doctor has checked you carefully, but problems can develop later. If you notice any problems or new symptoms, get medical treatment right away. Follow-up care is a key part of your treatment and safety. Be sure to make and go to all appointments, and call your doctor if you are having problems. It's also a good idea to know your test results and keep a list of the medicines you take. How can you care for yourself at home? Medicines  ? · If the doctor gave you a sedative:  ¨ For 24 hours, don't do anything that requires attention to detail. It takes time for the medicine's effects to completely wear off.   ¨ For your safety, do not drive or operate any machinery that could be dangerous. Wait until the medicine wears off and you can think clearly and react easily. ? · Take your medicines exactly as prescribed. Call your doctor if you think you are having a problem with your medicine. You may take some of the following medicines:  ¨ Antiarrhythmic medicines such as amiodarone (Cordarone). ¨ Beta-blockers such as propranolol (Inderal), metoprolol (Lopressor), or carvedilol (Coreg). ¨ Calcium channel blockers such as diltiazem (Cardizem) or verapamil (Calan). ¨ Digoxin (Lanoxin). You will get more details on the specific medicines your doctor prescribes. ? · If you take a blood thinner, be sure you get instructions about how to take your medicine safely. Blood thinners can cause serious bleeding problems. ? · Do not take any vitamins, over-the-counter medicines, or herbal products without talking to your doctor first.   ? Lifestyle changes  ? · Do not smoke. Smoking increases your risk of stroke and heart attack. If you need help quitting, talk to your doctor about stop-smoking programs and medicines. These can increase your chances of quitting for good. ? · Eat heart-healthy foods. ? · Limit alcohol to 2 drinks a day for men and 1 drink a day for women. Activity  ? · If your doctor recommends it, get more exercise. Walking is a good choice. Bit by bit, increase the amount you walk every day. Try for 30 minutes on most days of the week. You also may want to swim, bike, or do other activities. ? · When you exercise, watch for signs that your heart is working too hard. You are pushing too hard if you cannot talk while you are exercising. If you become short of breath or dizzy or have chest pain, sit down and rest immediately. ? · Check your pulse regularly. Place two fingers on the artery at the palm side of your wrist in line with your thumb. If your heartbeat seems uneven or fast, talk to your doctor. When should you call for help?   Call 46 anytime you think you may need emergency care. For example, call if:  ? · You have trouble breathing. ? · You passed out (lost consciousness). ? · You cough up pink, foamy mucus and you have trouble breathing. ? · You have symptoms of a heart attack. These may include:  ¨ Chest pain or pressure, or a strange feeling in the chest.  ¨ Sweating. ¨ Shortness of breath. ¨ Nausea or vomiting. ¨ Pain, pressure, or a strange feeling in the back, neck, jaw, or upper belly or in one or both shoulders or arms. ¨ Lightheadedness or sudden weakness. ¨ A fast or irregular heartbeat. After you call 911, the  may tell you to chew 1 adult-strength or 2 to 4 low-dose aspirin. Wait for an ambulance. Do not try to drive yourself. ? · You have symptoms of a stroke. These may include:  ¨ Sudden numbness, tingling, weakness, or loss of movement in your face, arm, or leg, especially on only one side of your body. ¨ Sudden vision changes. ¨ Sudden trouble speaking. ¨ Sudden confusion or trouble understanding simple statements. ¨ Sudden problems with walking or balance. ¨ A sudden, severe headache that is different from past headaches. ?Call your doctor now or seek immediate medical care if:  ? · You have new or worse nausea or vomiting. ? · You have new or increased shortness of breath. ? · You are dizzy or lightheaded, or you feel like you may faint. ? · You have sudden weight gain, such as more than 2 to 3 pounds in a day or 5 pounds in a week. ? · You have increased swelling in your legs, ankles, or feet. ? Watch closely for changes in your health, and be sure to contact your doctor if you have any problems. Where can you learn more? Go to http://harley-abilio.info/. Enter S206 in the search box to learn more about \"Electrical Cardioversion: Care Instructions. \"  Current as of: September 21, 2016  Content Version: 11.4  © 5940-9818 [employer].  Care instructions adapted under license by [employer] (which disclaims liability or warranty for this information). If you have questions about a medical condition or this instruction, always ask your healthcare professional. [address] any warranty or liability for your use of this information.

## 2019-01-30 PROBLEM — I34.1 MITRAL VALVE PROLAPSE: Chronic | Status: ACTIVE | Noted: 2017-05-02

## 2019-01-30 PROBLEM — Z79.899 HIGH RISK MEDICATION USE: Chronic | Status: ACTIVE | Noted: 2017-10-28

## 2019-12-10 ENCOUNTER — HOSPITAL ENCOUNTER (OUTPATIENT)
Dept: LAB | Age: 79
Discharge: HOME | End: 2019-12-10
Payer: MEDICARE

## 2019-12-10 DIAGNOSIS — I10 ESSENTIAL HYPERTENSION: ICD-10-CM

## 2019-12-10 DIAGNOSIS — Z79.899 ON POTASSIUM WASTING DIURETIC THERAPY: ICD-10-CM

## 2019-12-10 LAB
ANION GAP SERPL CALC-SCNC: 7 MMOL/L (ref 7–16)
BASOPHILS # BLD: 0 K/UL (ref 0–0.2)
BASOPHILS NFR BLD: 0 % (ref 0–2)
BUN SERPL-MCNC: 33 MG/DL (ref 8–23)
CALCIUM SERPL-MCNC: 10 MG/DL (ref 8.3–10.4)
CHLORIDE SERPL-SCNC: 105 MMOL/L (ref 98–107)
CO2 SERPL-SCNC: 29 MMOL/L (ref 21–32)
CREAT SERPL-MCNC: 1.6 MG/DL (ref 0.8–1.5)
DIFFERENTIAL METHOD BLD: ABNORMAL
EOSINOPHIL # BLD: 0.1 K/UL (ref 0–0.8)
EOSINOPHIL NFR BLD: 1 % (ref 0.5–7.8)
ERYTHROCYTE [DISTWIDTH] IN BLOOD BY AUTOMATED COUNT: 16.6 % (ref 11.9–14.6)
GLUCOSE SERPL-MCNC: 112 MG/DL (ref 65–100)
HCT VFR BLD AUTO: 32.2 % (ref 41.1–50.3)
HGB BLD-MCNC: 10.1 G/DL (ref 13.6–17.2)
IMM GRANULOCYTES # BLD AUTO: 0 K/UL (ref 0–0.5)
IMM GRANULOCYTES NFR BLD AUTO: 0 % (ref 0–5)
LYMPHOCYTES # BLD: 1.4 K/UL (ref 0.5–4.6)
LYMPHOCYTES NFR BLD: 22 % (ref 13–44)
MAGNESIUM SERPL-MCNC: 2.3 MG/DL (ref 1.8–2.4)
MCH RBC QN AUTO: 27.9 PG (ref 26.1–32.9)
MCHC RBC AUTO-ENTMCNC: 31.4 G/DL (ref 31.4–35)
MCV RBC AUTO: 89 FL (ref 79.6–97.8)
MONOCYTES # BLD: 0.7 K/UL (ref 0.1–1.3)
MONOCYTES NFR BLD: 11 % (ref 4–12)
NEUTS SEG # BLD: 4.3 K/UL (ref 1.7–8.2)
NEUTS SEG NFR BLD: 66 % (ref 43–78)
NRBC # BLD: 0 K/UL (ref 0–0.2)
PLATELET # BLD AUTO: 191 K/UL (ref 150–450)
PMV BLD AUTO: 11.3 FL (ref 9.4–12.3)
POTASSIUM SERPL-SCNC: 4.4 MMOL/L (ref 3.5–5.1)
RBC # BLD AUTO: 3.62 M/UL (ref 4.23–5.6)
SODIUM SERPL-SCNC: 141 MMOL/L (ref 136–145)
WBC # BLD AUTO: 6.5 K/UL (ref 4.3–11.1)

## 2019-12-10 PROCEDURE — 36415 COLL VENOUS BLD VENIPUNCTURE: CPT

## 2019-12-10 PROCEDURE — 80048 BASIC METABOLIC PNL TOTAL CA: CPT

## 2019-12-10 PROCEDURE — 85025 COMPLETE CBC W/AUTO DIFF WBC: CPT

## 2019-12-10 PROCEDURE — 83735 ASSAY OF MAGNESIUM: CPT

## 2019-12-11 NOTE — PROGRESS NOTES
The lab results show a mildly increased creatinine. Recommend hold the hydrochlorothiazide and repeat in 1 week. He is mildly anemic also. However the blood count is stable.

## 2019-12-17 ENCOUNTER — HOSPITAL ENCOUNTER (OUTPATIENT)
Dept: GENERAL RADIOLOGY | Age: 79
Discharge: HOME | End: 2019-12-17

## 2019-12-17 DIAGNOSIS — R05.8 PRODUCTIVE COUGH: ICD-10-CM

## 2019-12-31 ENCOUNTER — HOSPITAL ENCOUNTER (OUTPATIENT)
Dept: LAB | Age: 79
Discharge: HOME | End: 2019-12-31
Payer: MEDICARE

## 2019-12-31 DIAGNOSIS — N18.30 CKD (CHRONIC KIDNEY DISEASE), STAGE III (HCC): ICD-10-CM

## 2019-12-31 DIAGNOSIS — R41.82 ALTERED MENTAL STATUS, UNSPECIFIED ALTERED MENTAL STATUS TYPE: ICD-10-CM

## 2019-12-31 DIAGNOSIS — E05.20 NODULAR GOITER, TOXIC OR WITH HYPERTHYROIDISM: ICD-10-CM

## 2019-12-31 DIAGNOSIS — Z79.899 LONG TERM CURRENT USE OF ANTIARRHYTHMIC DRUG: ICD-10-CM

## 2019-12-31 DIAGNOSIS — I10 ESSENTIAL HYPERTENSION: ICD-10-CM

## 2019-12-31 DIAGNOSIS — I48.0 PAROXYSMAL ATRIAL FIBRILLATION (HCC): ICD-10-CM

## 2019-12-31 LAB
ALBUMIN SERPL-MCNC: 3.1 G/DL (ref 3.2–4.6)
ALBUMIN/GLOB SERPL: 0.8 {RATIO} (ref 1.2–3.5)
ALP SERPL-CCNC: 49 U/L (ref 50–136)
ALT SERPL-CCNC: 13 U/L (ref 12–65)
ANION GAP SERPL CALC-SCNC: 4 MMOL/L (ref 7–16)
AST SERPL-CCNC: 10 U/L (ref 15–37)
BASOPHILS # BLD: 0 K/UL (ref 0–0.2)
BASOPHILS NFR BLD: 1 % (ref 0–2)
BILIRUB SERPL-MCNC: 0.4 MG/DL (ref 0.2–1.1)
BUN SERPL-MCNC: 21 MG/DL (ref 8–23)
CALCIUM SERPL-MCNC: 9.8 MG/DL (ref 8.3–10.4)
CHLORIDE SERPL-SCNC: 106 MMOL/L (ref 98–107)
CO2 SERPL-SCNC: 29 MMOL/L (ref 21–32)
CREAT SERPL-MCNC: 1.09 MG/DL (ref 0.8–1.5)
DIFFERENTIAL METHOD BLD: ABNORMAL
EOSINOPHIL # BLD: 0.1 K/UL (ref 0–0.8)
EOSINOPHIL NFR BLD: 1 % (ref 0.5–7.8)
ERYTHROCYTE [DISTWIDTH] IN BLOOD BY AUTOMATED COUNT: 15.9 % (ref 11.9–14.6)
GLOBULIN SER CALC-MCNC: 3.8 G/DL (ref 2.3–3.5)
GLUCOSE SERPL-MCNC: 94 MG/DL (ref 65–100)
HCT VFR BLD AUTO: 28.4 % (ref 41.1–50.3)
HGB BLD-MCNC: 8.7 G/DL (ref 13.6–17.2)
IMM GRANULOCYTES # BLD AUTO: 0 K/UL (ref 0–0.5)
IMM GRANULOCYTES NFR BLD AUTO: 0 % (ref 0–5)
LYMPHOCYTES # BLD: 1.9 K/UL (ref 0.5–4.6)
LYMPHOCYTES NFR BLD: 29 % (ref 13–44)
MCH RBC QN AUTO: 27.4 PG (ref 26.1–32.9)
MCHC RBC AUTO-ENTMCNC: 30.6 G/DL (ref 31.4–35)
MCV RBC AUTO: 89.3 FL (ref 79.6–97.8)
MONOCYTES # BLD: 0.9 K/UL (ref 0.1–1.3)
MONOCYTES NFR BLD: 14 % (ref 4–12)
NEUTS SEG # BLD: 3.7 K/UL (ref 1.7–8.2)
NEUTS SEG NFR BLD: 56 % (ref 43–78)
NRBC # BLD: 0 K/UL (ref 0–0.2)
PLATELET # BLD AUTO: 238 K/UL (ref 150–450)
PMV BLD AUTO: 11.4 FL (ref 9.4–12.3)
POTASSIUM SERPL-SCNC: 4.2 MMOL/L (ref 3.5–5.1)
PROT SERPL-MCNC: 6.9 G/DL (ref 6.3–8.2)
RBC # BLD AUTO: 3.18 M/UL (ref 4.23–5.67)
SODIUM SERPL-SCNC: 139 MMOL/L (ref 136–145)
T4 FREE SERPL-MCNC: 1.8 NG/DL (ref 0.78–1.46)
TSH SERPL DL<=0.005 MIU/L-ACNC: <0.005 UIU/ML (ref 0.36–3.74)
WBC # BLD AUTO: 6.7 K/UL (ref 4.3–11.1)

## 2019-12-31 PROCEDURE — 80053 COMPREHEN METABOLIC PANEL: CPT

## 2019-12-31 PROCEDURE — 84439 ASSAY OF FREE THYROXINE: CPT

## 2019-12-31 PROCEDURE — 36415 COLL VENOUS BLD VENIPUNCTURE: CPT

## 2019-12-31 PROCEDURE — 84443 ASSAY THYROID STIM HORMONE: CPT

## 2019-12-31 PROCEDURE — 85025 COMPLETE CBC W/AUTO DIFF WBC: CPT

## 2020-01-21 ENCOUNTER — HOSPITAL ENCOUNTER (OUTPATIENT)
Dept: LAB | Age: 80
Discharge: HOME | End: 2020-01-21
Payer: MEDICARE

## 2020-01-21 DIAGNOSIS — I10 ESSENTIAL HYPERTENSION: ICD-10-CM

## 2020-01-21 DIAGNOSIS — E78.2 MIXED HYPERLIPIDEMIA: ICD-10-CM

## 2020-01-21 DIAGNOSIS — Z79.899 ON POTASSIUM WASTING DIURETIC THERAPY: ICD-10-CM

## 2020-01-21 DIAGNOSIS — Z79.01 LONG TERM CURRENT USE OF ANTICOAGULANT THERAPY: ICD-10-CM

## 2020-01-21 DIAGNOSIS — Z85.46 H/O PROSTATE CANCER: ICD-10-CM

## 2020-01-21 DIAGNOSIS — I48.0 PAROXYSMAL ATRIAL FIBRILLATION (HCC): ICD-10-CM

## 2020-01-21 DIAGNOSIS — Z12.5 ENCOUNTER FOR SCREENING FOR MALIGNANT NEOPLASM OF PROSTATE: ICD-10-CM

## 2020-01-21 DIAGNOSIS — D64.9 MILD ANEMIA: ICD-10-CM

## 2020-01-21 LAB
ALBUMIN SERPL-MCNC: 3.3 G/DL (ref 3.2–4.6)
ALBUMIN/GLOB SERPL: 1.1 {RATIO} (ref 1.2–3.5)
ALP SERPL-CCNC: 51 U/L (ref 50–136)
ALT SERPL-CCNC: 20 U/L (ref 12–65)
ANION GAP SERPL CALC-SCNC: 7 MMOL/L (ref 7–16)
AST SERPL-CCNC: 6 U/L (ref 15–37)
BASOPHILS # BLD: 0 K/UL (ref 0–0.2)
BASOPHILS NFR BLD: 0 % (ref 0–2)
BILIRUB SERPL-MCNC: 0.5 MG/DL (ref 0.2–1.1)
BUN SERPL-MCNC: 26 MG/DL (ref 8–23)
CALCIUM SERPL-MCNC: 9.7 MG/DL (ref 8.3–10.4)
CHLORIDE SERPL-SCNC: 108 MMOL/L (ref 98–107)
CHOLEST SERPL-MCNC: 185 MG/DL
CO2 SERPL-SCNC: 28 MMOL/L (ref 21–32)
CREAT SERPL-MCNC: 1 MG/DL (ref 0.8–1.5)
DIFFERENTIAL METHOD BLD: ABNORMAL
EOSINOPHIL # BLD: 0 K/UL (ref 0–0.8)
EOSINOPHIL NFR BLD: 0 % (ref 0.5–7.8)
ERYTHROCYTE [DISTWIDTH] IN BLOOD BY AUTOMATED COUNT: 16.3 % (ref 11.9–14.6)
GLOBULIN SER CALC-MCNC: 3.1 G/DL (ref 2.3–3.5)
GLUCOSE SERPL-MCNC: 96 MG/DL (ref 65–100)
HCT VFR BLD AUTO: 31.2 % (ref 41.1–50.3)
HDLC SERPL-MCNC: 75 MG/DL (ref 40–60)
HDLC SERPL: 2.5 {RATIO}
HGB BLD-MCNC: 9.7 G/DL (ref 13.6–17.2)
IMM GRANULOCYTES # BLD AUTO: 0 K/UL (ref 0–0.5)
IMM GRANULOCYTES NFR BLD AUTO: 0 % (ref 0–5)
LDLC SERPL CALC-MCNC: 96.8 MG/DL
LIPID PROFILE,FLP: ABNORMAL
LYMPHOCYTES # BLD: 1.3 K/UL (ref 0.5–4.6)
LYMPHOCYTES NFR BLD: 14 % (ref 13–44)
MAGNESIUM SERPL-MCNC: 1.9 MG/DL (ref 1.8–2.4)
MCH RBC QN AUTO: 28.1 PG (ref 26.1–32.9)
MCHC RBC AUTO-ENTMCNC: 31.1 G/DL (ref 31.4–35)
MCV RBC AUTO: 90.4 FL (ref 79.6–97.8)
MONOCYTES # BLD: 1.1 K/UL (ref 0.1–1.3)
MONOCYTES NFR BLD: 12 % (ref 4–12)
NEUTS SEG # BLD: 6.9 K/UL (ref 1.7–8.2)
NEUTS SEG NFR BLD: 74 % (ref 43–78)
NRBC # BLD: 0 K/UL (ref 0–0.2)
PLATELET # BLD AUTO: 252 K/UL (ref 150–450)
PLATELET COMMENTS,PCOM: ADEQUATE
PMV BLD AUTO: 12 FL (ref 9.4–12.3)
POTASSIUM SERPL-SCNC: 4.2 MMOL/L (ref 3.5–5.1)
PROT SERPL-MCNC: 6.4 G/DL (ref 6.3–8.2)
PSA SERPL-MCNC: 0.8 NG/ML
RBC # BLD AUTO: 3.45 M/UL (ref 4.23–5.6)
RBC MORPH BLD: ABNORMAL
SODIUM SERPL-SCNC: 143 MMOL/L (ref 136–145)
TRIGL SERPL-MCNC: 66 MG/DL (ref 35–150)
VLDLC SERPL CALC-MCNC: 13.2 MG/DL (ref 6–23)
WBC # BLD AUTO: 9.3 K/UL (ref 4.3–11.1)
WBC MORPH BLD: ABNORMAL

## 2020-01-21 PROCEDURE — 85025 COMPLETE CBC W/AUTO DIFF WBC: CPT

## 2020-01-21 PROCEDURE — 80053 COMPREHEN METABOLIC PANEL: CPT

## 2020-01-21 PROCEDURE — 36415 COLL VENOUS BLD VENIPUNCTURE: CPT

## 2020-01-21 PROCEDURE — 84153 ASSAY OF PSA TOTAL: CPT

## 2020-01-21 PROCEDURE — 80061 LIPID PANEL: CPT

## 2020-01-21 PROCEDURE — 83735 ASSAY OF MAGNESIUM: CPT
